# Patient Record
Sex: FEMALE | Race: WHITE | NOT HISPANIC OR LATINO | Employment: FULL TIME | URBAN - METROPOLITAN AREA
[De-identification: names, ages, dates, MRNs, and addresses within clinical notes are randomized per-mention and may not be internally consistent; named-entity substitution may affect disease eponyms.]

---

## 2017-09-25 ENCOUNTER — RADIANT APPOINTMENT (OUTPATIENT)
Dept: MAMMOGRAPHY | Facility: CLINIC | Age: 52
End: 2017-09-25
Payer: COMMERCIAL

## 2017-09-25 ENCOUNTER — OFFICE VISIT (OUTPATIENT)
Dept: OBGYN | Facility: CLINIC | Age: 52
End: 2017-09-25
Payer: COMMERCIAL

## 2017-09-25 VITALS
DIASTOLIC BLOOD PRESSURE: 58 MMHG | SYSTOLIC BLOOD PRESSURE: 98 MMHG | HEIGHT: 68 IN | BODY MASS INDEX: 22.28 KG/M2 | WEIGHT: 147 LBS

## 2017-09-25 DIAGNOSIS — Z12.4 CERVICAL CANCER SCREENING: ICD-10-CM

## 2017-09-25 DIAGNOSIS — Z13.6 ENCOUNTER FOR LIPID SCREENING FOR CARDIOVASCULAR DISEASE: ICD-10-CM

## 2017-09-25 DIAGNOSIS — Z13.0 SCREENING FOR DISORDER OF BLOOD AND BLOOD-FORMING ORGANS: ICD-10-CM

## 2017-09-25 DIAGNOSIS — Z01.419 ENCOUNTER FOR GYNECOLOGICAL EXAMINATION WITHOUT ABNORMAL FINDING: Primary | ICD-10-CM

## 2017-09-25 DIAGNOSIS — Z13.29 SCREENING FOR THYROID DISORDER: ICD-10-CM

## 2017-09-25 DIAGNOSIS — Z13.228 SCREENING FOR METABOLIC DISORDER: ICD-10-CM

## 2017-09-25 DIAGNOSIS — Z12.31 VISIT FOR SCREENING MAMMOGRAM: ICD-10-CM

## 2017-09-25 DIAGNOSIS — Z23 NEED FOR PROPHYLACTIC VACCINATION AND INOCULATION AGAINST INFLUENZA: ICD-10-CM

## 2017-09-25 DIAGNOSIS — Z13.220 ENCOUNTER FOR LIPID SCREENING FOR CARDIOVASCULAR DISEASE: ICD-10-CM

## 2017-09-25 LAB
ERYTHROCYTE [DISTWIDTH] IN BLOOD BY AUTOMATED COUNT: 12 % (ref 10–15)
HCT VFR BLD AUTO: 40.7 % (ref 35–47)
HGB BLD-MCNC: 13.8 G/DL (ref 11.7–15.7)
MCH RBC QN AUTO: 32.2 PG (ref 26.5–33)
MCHC RBC AUTO-ENTMCNC: 33.9 G/DL (ref 31.5–36.5)
MCV RBC AUTO: 95 FL (ref 78–100)
PLATELET # BLD AUTO: 205 10E9/L (ref 150–450)
RBC # BLD AUTO: 4.28 10E12/L (ref 3.8–5.2)
WBC # BLD AUTO: 6.3 10E9/L (ref 4–11)

## 2017-09-25 PROCEDURE — 90471 IMMUNIZATION ADMIN: CPT | Performed by: OBSTETRICS & GYNECOLOGY

## 2017-09-25 PROCEDURE — G0202 SCR MAMMO BI INCL CAD: HCPCS | Mod: TC

## 2017-09-25 PROCEDURE — 36415 COLL VENOUS BLD VENIPUNCTURE: CPT | Performed by: OBSTETRICS & GYNECOLOGY

## 2017-09-25 PROCEDURE — 84443 ASSAY THYROID STIM HORMONE: CPT | Performed by: OBSTETRICS & GYNECOLOGY

## 2017-09-25 PROCEDURE — 77063 BREAST TOMOSYNTHESIS BI: CPT | Mod: TC

## 2017-09-25 PROCEDURE — 99396 PREV VISIT EST AGE 40-64: CPT | Mod: 25 | Performed by: OBSTETRICS & GYNECOLOGY

## 2017-09-25 PROCEDURE — 85027 COMPLETE CBC AUTOMATED: CPT | Performed by: OBSTETRICS & GYNECOLOGY

## 2017-09-25 PROCEDURE — G0145 SCR C/V CYTO,THINLAYER,RESCR: HCPCS | Performed by: OBSTETRICS & GYNECOLOGY

## 2017-09-25 PROCEDURE — 80061 LIPID PANEL: CPT | Performed by: OBSTETRICS & GYNECOLOGY

## 2017-09-25 PROCEDURE — 90686 IIV4 VACC NO PRSV 0.5 ML IM: CPT | Performed by: OBSTETRICS & GYNECOLOGY

## 2017-09-25 PROCEDURE — 80053 COMPREHEN METABOLIC PANEL: CPT | Performed by: OBSTETRICS & GYNECOLOGY

## 2017-09-25 PROCEDURE — 87624 HPV HI-RISK TYP POOLED RSLT: CPT | Performed by: OBSTETRICS & GYNECOLOGY

## 2017-09-25 ASSESSMENT — ANXIETY QUESTIONNAIRES
5. BEING SO RESTLESS THAT IT IS HARD TO SIT STILL: NOT AT ALL
3. WORRYING TOO MUCH ABOUT DIFFERENT THINGS: NOT AT ALL
6. BECOMING EASILY ANNOYED OR IRRITABLE: NOT AT ALL
1. FEELING NERVOUS, ANXIOUS, OR ON EDGE: NOT AT ALL
IF YOU CHECKED OFF ANY PROBLEMS ON THIS QUESTIONNAIRE, HOW DIFFICULT HAVE THESE PROBLEMS MADE IT FOR YOU TO DO YOUR WORK, TAKE CARE OF THINGS AT HOME, OR GET ALONG WITH OTHER PEOPLE: NOT DIFFICULT AT ALL
7. FEELING AFRAID AS IF SOMETHING AWFUL MIGHT HAPPEN: NOT AT ALL
GAD7 TOTAL SCORE: 0
2. NOT BEING ABLE TO STOP OR CONTROL WORRYING: NOT AT ALL

## 2017-09-25 ASSESSMENT — PATIENT HEALTH QUESTIONNAIRE - PHQ9
5. POOR APPETITE OR OVEREATING: NOT AT ALL
SUM OF ALL RESPONSES TO PHQ QUESTIONS 1-9: 0

## 2017-09-25 NOTE — PROGRESS NOTES
Susan is a 51 year old  female who presents for annual exam.     Besides routine health maintenance, she has no other health concerns today . Is fasting today, would like flu shot.    HPI:  The patient does not have PCP.      Periods regular yet. Vasectomy for contraception    Is fasting for labs.   Mammo today.     Exercising 3-4x/wk. Takes vit D, fish oil, MTV.       GYNECOLOGIC HISTORY:    Patient's last menstrual period was 2017.  Her current contraception method is: vasectomy.  She  reports that she has never smoked. She does not have any smokeless tobacco history on file.      Patient is sexually active.  STD testing offered?  Declined  Last PHQ-9 score on record =   PHQ-9 SCORE 2017   Total Score 0     Last GAD7 score on record =   BETH-7 SCORE 2017   Total Score 0     Alcohol Score = 3    HEALTH MAINTENANCE:  Cholesterol: (  Cholesterol   Date Value Ref Range Status   2014 157 125 - 200 mg/dL Final      Last Mammo: one year ago, Result: normal, Next Mammo: today   Pap: (  Lab Results   Component Value Date    PAP Negative 2012    PAP Negative 2011    PAP Negative 2010    ) HPV-  Colonoscopy:  2011, Result: normal, Next Colonoscopy: 4 years.  Dexa:  NA    Health maintenance updated:  yes    HISTORY:  Obstetric History       T4      L4     SAB0   TAB0   Ectopic0   Multiple0   Live Births4       # Outcome Date GA Lbr Remigio/2nd Weight Sex Delivery Anes PTL Lv   5 Term 02 39w0d  8 lb 8 oz (3.856 kg) M    NYA      Name: HOMA   4 Term 00 38w0d  7 lb 14 oz (3.572 kg) F    NYA      Name: BRAYDON   3 Term 01/15/99 38w0d  6 lb 15 oz (3.147 kg) M    NYA      Name: ANN   2 Term 97 39w0d  9 lb 2 oz (4.139 kg) M    NYA      Name: JOSÉ MIGUEL   1 SAB                   There is no problem list on file for this patient.    Past Surgical History:   Procedure Laterality Date     ENDOMETRIAL SAMPLING (BIOPSY)  1/26/10    FOR ENDO  "CELLS ON PAP--SECRETORY ENDO      Social History   Substance Use Topics     Smoking status: Never Smoker     Smokeless tobacco: Not on file     Alcohol use 0.0 oz/week     0 Standard drinks or equivalent per week      Problem (# of Occurrences) Relation (Name,Age of Onset)    Breast Cancer (1) Mother    Colon Cancer (1) Maternal Grandfather    Hyperlipidemia (1) Father    Hypertension (1) Unspecified            Current Outpatient Prescriptions   Medication Sig     multivitamin, therapeutic with minerals (MULTI-VITAMIN) TABS Take 1 tablet by mouth daily     VITAMIN D, CHOLECALCIFEROL, PO Take by mouth daily     Omega-3 Fatty Acids (OMEGA-3 FISH OIL PO)      No current facility-administered medications for this visit.      No Known Allergies    Past medical, surgical, social and family histories were reviewed and updated in EPIC.    ROS:   12 point review of systems negative other than symptoms noted below.  Constitutional: Fatigue  Genitourinary: Cramps  Musculoskeletal: Joint Pain    EXAM:  BP 98/58  Ht 5' 8\" (1.727 m)  Wt 147 lb (66.7 kg)  LMP 09/06/2017  Breastfeeding? No  BMI 22.35 kg/m2   BMI: Body mass index is 22.35 kg/(m^2).    PHYSICAL EXAM:  Constitutional:  Appearance: Well nourished, well developed, alert, in no acute distress  Neck:  Lymph Nodes:  No lymphadenopathy present    Thyroid:  Gland size normal, nontender, no nodules or masses present  on palpation  Chest:  Respiratory Effort:  Breathing unlabored  Cardiovascular:    Heart: Auscultation:  Regular rate, normal rhythm, no murmurs present  Breasts: Inspection of Breasts:  No lymphadenopathy present., Palpation of Breasts and Axillae:  No masses present on palpation, no breast tenderness., Axillary Lymph Nodes:  No lymphadenopathy present. and No nodularity, asymmetry or nipple discharge bilaterally.  Gastrointestinal:   Abdominal Examination:  Abdomen nontender to palpation, tone normal without rigidity or guarding, no masses present, " umbilicus without lesions   Liver and Spleen:  No hepatomegaly present, liver nontender to palpation    Hernias:  No hernias present  Lymphatic: Lymph Nodes:  No other lymphadenopathy present  Skin:  General Inspection:  No rashes present, no lesions present, no areas of  discoloration    Genitalia and Groin:  No rashes present, no lesions present, no areas of  discoloration, no masses present  Neurologic/Psychiatric:    Mental Status:  Oriented X3     Pelvic Exam:  External Genitalia:     Normal appearance for age, no discharge present, no tenderness present, no inflammatory lesions present, color normal  Vagina:     Normal vaginal vault without central or paravaginal defects, no discharge present, no inflammatory lesions present, no masses present  Bladder:     Nontender to palpation  Urethra:   Urethral Body:  Urethra palpation normal, urethra structural support normal   Urethral Meatus:  No erythema or lesions present  Cervix:     Appearance healthy, no lesions present, nontender to palpation, no bleeding present  Uterus:     Uterus: firm, normal sized and nontender, midplane in position.   Adnexa:     No adnexal tenderness present, no adnexal masses present  Perineum:     Perineum within normal limits, no evidence of trauma, no rashes or skin lesions present  Anus:     Anus within normal limits, no hemorrhoids present  Inguinal Lymph Nodes:     No lymphadenopathy present  Pubic Hair:     Normal pubic hair distribution for age  Genitalia and Groin:     No rashes present, no lesions present, no areas of discoloration, no masses present      COUNSELING:   Reviewed preventive health counseling, as reflected in patient instructions       Regular exercise       Healthy diet/nutrition       Osteoporosis Prevention/Bone Health      BMI: Body mass index is 22.35 kg/(m^2).        ASSESSMENT:  51 year old female with satisfactory annual exam.    ICD-10-CM    1. Encounter for gynecological examination without abnormal  finding Z01.419 Pap imaged thin layer screen with HPV - recommended age 30 - 65     HPV High Risk Types DNA Cervical   2. Encounter for lipid screening for cardiovascular disease Z13.220 Lipid panel    Z13.6    3. Screening for metabolic disorder Z13.228 Comprehensive metabolic panel   4. Screening for disorder of blood and blood-forming organs Z13.0 CBC with platelets   5. Screening for thyroid disorder Z13.29 TSH   6. Cervical cancer screening Z12.4 Pap imaged thin layer screen with HPV - recommended age 30 - 65     HPV High Risk Types DNA Cervical       PLAN:  -Pap/hpv obtained for cervical cancer screening. Reviewed guidelines-pap q 3yrs until age 30 when co-testing q 5 years.  -Breast self awareness discussed. Due for mammogram.  -Discussed exercise-making plan, strength training. Nutrition encouraged.  -Colonoscopy UTD  -Osteoporosis prevention discussed.  -Fasting labs, orders reviewed  -Regular periods yet. Vasectomy for contraception.   -Return one year for next annual exam      Annette Gallo Masters, DO

## 2017-09-25 NOTE — MR AVS SNAPSHOT
"              After Visit Summary   9/25/2017    Susan Reyes    MRN: [id number]           Patient Information     Date Of Birth          1965        Visit Information        Provider Department      9/25/2017 9:00 AM Annette Roman DO [facility]        Today's Diagnoses     Encounter for gynecological examination without abnormal finding    -  1    Encounter for lipid screening for cardiovascular disease        Screening for metabolic disorder        Screening for disorder of blood and blood-forming organs        Screening for thyroid disorder        Cervical cancer screening        Need for prophylactic vaccination and inoculation against influenza           Follow-ups after your visit        Follow-up notes from your care team     Return in about 1 year (around 9/25/2018).      Who to contact     If you have questions or need follow up information about today's clinic visit or your schedule please contact [facility] directly at [phone].  Normal or non-critical lab and imaging results will be communicated to you by [employer], letter or phone within 4 business days after the clinic has received the results. If you do not hear from us within 7 days, please contact the clinic through [employer] or phone. If you have a critical or abnormal lab result, we will notify you by phone as soon as possible.  Submit refill requests through [employer] or call your pharmacy and they will forward the refill request to us. Please allow 3 business days for your refill to be completed.          Additional Information About Your Visit        MyChart Information     [employer] lets you send messages to your doctor, view your test results, renew your prescriptions, schedule appointments and more. To sign up, go to [email or web address] . Click on \"Log in\" on the left side of the screen, which will take you to the Welcome page. Then click on \"Sign up Now\" on the right side of the " "page.     You will be asked to enter the access code listed below, as well as some personal information. Please follow the directions to create your username and password.     Your access code is: [email or web address]  Expires: 2017 10:16 AM     Your access code will  in 90 days. If you need help or a new code, please call your [place] clinic or [phone].        Care EveryWhere ID     This is your Care EveryWhere ID. This could be used by other organizations to access your [place] medical records  [id number]        Your Vitals Were     Height Last Period Breastfeeding? BMI (Body Mass Index)          5' 8\" (1.727 m) 2017 No 22.35 kg/m2         Blood Pressure from Last 3 Encounters:   17 98/58   16 100/52   08/10/15 112/68    Weight from Last 3 Encounters:   17 147 lb (66.7 kg)   16 146 lb (66.2 kg)   08/10/15 147 lb (66.7 kg)              We Performed the Following     CBC with platelets     Comprehensive metabolic panel     FLU VAC, SPLIT VIRUS IM > 3 YO (QUADRIVALENT) [[id number]]     HPV High Risk Types DNA Cervical     Lipid panel     Pap imaged thin layer screen with HPV - recommended age 30 - 65     TSH     Vaccine Administration, Initial [26595]        Primary Care Provider    None Specified       No primary provider on file.        Equal Access to Services     MARS QUICK : Hadii carline Lopez, waaxda yara, qaybta dakotaalcosta plummer . So [facility] [phone].    ATENCIÓN: Si habla español, tiene a pickett disposición servicios gratuitos de asistencia lingüística. Sotero al [phone].    We comply with applicable federal civil rights laws and [place] laws. We do not discriminate on the basis of race, color, national origin, age, disability sex, sexual orientation or gender identity.            Thank you!     Thank you for choosing [facility]  for your care. Our goal is always to provide you with " excellent care. Hearing back from our patients is one way we can continue to improve our services. Please take a few minutes to complete the written survey that you may receive in the mail after your visit with us. Thank you!             Your Updated Medication List - Protect others around you: Learn how to safely use, store and throw away your medicines at www.disposemymeds.org.          This list is accurate as of: 9/25/17 10:16 AM.  Always use your most recent med list.                   Brand Name Dispense Instructions for use Diagnosis    Multi-vitamin Tabs tablet      Take 1 tablet by mouth daily        OMEGA-3 FISH OIL PO           VITAMIN D (CHOLECALCIFEROL) PO      Take by mouth daily

## 2017-09-25 NOTE — LETTER
October 6, 2017    Susan Reyes  [address] [place] [id number]    Dear Susan,  We are happy to inform you that your PAP smear result from 9/25/17 is normal.  We are now able to do a follow up test on PAP smears. The DNA test is for HPV (Human Papilloma Virus). Cervical cancer is closely linked with certain types of HPV. Your result showed no evidence of high risk HPV.  Therefore we recommend you return in 5 years for your next pap smear and HPV test.  You will still need to return to the clinic every year for an annual exam and other preventive tests.  Please contact the clinic at [phone] with any questions.  Sincerely,    Annette Freemans, DO/rlm

## 2017-09-25 NOTE — PROGRESS NOTES
Injectable Influenza Immunization Documentation    1.  Is the person to be vaccinated sick today?   No    2. Does the person to be vaccinated have an allergy to a component   of the vaccine?   No    3. Has the person to be vaccinated ever had a serious reaction   to influenza vaccine in the past?   No    4. Has the person to be vaccinated ever had Guillain-Barré syndrome?   No    Form completed by Isabell Tidwell MA

## 2017-09-26 LAB
ALBUMIN SERPL-MCNC: 3.6 G/DL (ref 3.4–5)
ALP SERPL-CCNC: 52 U/L (ref 40–150)
ALT SERPL W P-5'-P-CCNC: 23 U/L (ref 0–50)
ANION GAP SERPL CALCULATED.3IONS-SCNC: 5 MMOL/L (ref 3–14)
AST SERPL W P-5'-P-CCNC: 17 U/L (ref 0–45)
BILIRUB SERPL-MCNC: 0.8 MG/DL (ref 0.2–1.3)
BUN SERPL-MCNC: 13 MG/DL (ref 7–30)
CALCIUM SERPL-MCNC: 8.5 MG/DL (ref 8.5–10.1)
CHLORIDE SERPL-SCNC: 104 MMOL/L (ref 94–109)
CHOLEST SERPL-MCNC: 155 MG/DL
CO2 SERPL-SCNC: 29 MMOL/L (ref 20–32)
CREAT SERPL-MCNC: 0.68 MG/DL (ref 0.52–1.04)
GFR SERPL CREATININE-BSD FRML MDRD: >90 ML/MIN/1.7M2
GLUCOSE SERPL-MCNC: 72 MG/DL (ref 70–99)
HDLC SERPL-MCNC: 76 MG/DL
LDLC SERPL CALC-MCNC: 71 MG/DL
NONHDLC SERPL-MCNC: 79 MG/DL
POTASSIUM SERPL-SCNC: 3.7 MMOL/L (ref 3.4–5.3)
PROT SERPL-MCNC: 7.9 G/DL (ref 6.8–8.8)
SODIUM SERPL-SCNC: 138 MMOL/L (ref 133–144)
TRIGL SERPL-MCNC: 42 MG/DL
TSH SERPL DL<=0.005 MIU/L-ACNC: 0.86 MU/L (ref 0.4–4)

## 2017-09-26 ASSESSMENT — ANXIETY QUESTIONNAIRES: GAD7 TOTAL SCORE: 0

## 2017-09-27 LAB
COPATH REPORT: NORMAL
PAP: NORMAL

## 2017-09-29 LAB
FINAL DIAGNOSIS: NORMAL
HPV HR 12 DNA CVX QL NAA+PROBE: NEGATIVE
HPV16 DNA SPEC QL NAA+PROBE: NEGATIVE
HPV18 DNA SPEC QL NAA+PROBE: NEGATIVE
SPECIMEN DESCRIPTION: NORMAL

## 2017-10-20 ENCOUNTER — TELEPHONE (OUTPATIENT)
Dept: OBGYN | Facility: CLINIC | Age: 52
End: 2017-10-20

## 2017-10-20 NOTE — TELEPHONE ENCOUNTER
Annual 9/25/17. Pt was fine yesterday. Woke up during night. Room was spinning. N&V, no diarrhea. Pt has never had vertigo or dizziness. Pt only takes vitamin. No injury to head or neck. Denies fever. Pt worked out last evening, normal routine, and then had 10 min sauna. Could be dehydration? Pt stated she was drinking water.  No dizziness now. Worried about going into the weekend with the vertigo. Routing to Dr. Roman. Please advise.

## 2017-10-20 NOTE — TELEPHONE ENCOUNTER
Called pt she denied speech changes, vision changes, facial movement changes.  Informed of Dr. Roman' recommendations. Pt stated understanding and had no further questions.

## 2017-10-20 NOTE — TELEPHONE ENCOUNTER
Often vertigo needs to run its course. She can try an OTC med like antivert. Resting otherwise for time being.  Please ensure no other symptoms like speech changes, vision changes, facial movement changes.     Annette Gallo Masters, DO

## 2018-10-19 ENCOUNTER — RADIANT APPOINTMENT (OUTPATIENT)
Dept: MAMMOGRAPHY | Facility: CLINIC | Age: 53
End: 2018-10-19
Payer: COMMERCIAL

## 2018-10-19 ENCOUNTER — OFFICE VISIT (OUTPATIENT)
Dept: OBGYN | Facility: CLINIC | Age: 53
End: 2018-10-19
Payer: COMMERCIAL

## 2018-10-19 VITALS
SYSTOLIC BLOOD PRESSURE: 102 MMHG | BODY MASS INDEX: 22.58 KG/M2 | WEIGHT: 149 LBS | HEIGHT: 68 IN | DIASTOLIC BLOOD PRESSURE: 60 MMHG | HEART RATE: 68 BPM

## 2018-10-19 DIAGNOSIS — Z12.31 VISIT FOR SCREENING MAMMOGRAM: ICD-10-CM

## 2018-10-19 DIAGNOSIS — Z23 NEED FOR PROPHYLACTIC VACCINATION AND INOCULATION AGAINST INFLUENZA: ICD-10-CM

## 2018-10-19 DIAGNOSIS — Z01.419 ENCOUNTER FOR GYNECOLOGICAL EXAMINATION WITHOUT ABNORMAL FINDING: Primary | ICD-10-CM

## 2018-10-19 PROCEDURE — 90686 IIV4 VACC NO PRSV 0.5 ML IM: CPT | Performed by: OBSTETRICS & GYNECOLOGY

## 2018-10-19 PROCEDURE — 77063 BREAST TOMOSYNTHESIS BI: CPT | Mod: TC

## 2018-10-19 PROCEDURE — 77067 SCR MAMMO BI INCL CAD: CPT | Mod: TC

## 2018-10-19 PROCEDURE — 90471 IMMUNIZATION ADMIN: CPT | Performed by: OBSTETRICS & GYNECOLOGY

## 2018-10-19 PROCEDURE — 99396 PREV VISIT EST AGE 40-64: CPT | Performed by: OBSTETRICS & GYNECOLOGY

## 2018-10-19 ASSESSMENT — ANXIETY QUESTIONNAIRES
5. BEING SO RESTLESS THAT IT IS HARD TO SIT STILL: NOT AT ALL
3. WORRYING TOO MUCH ABOUT DIFFERENT THINGS: NOT AT ALL
IF YOU CHECKED OFF ANY PROBLEMS ON THIS QUESTIONNAIRE, HOW DIFFICULT HAVE THESE PROBLEMS MADE IT FOR YOU TO DO YOUR WORK, TAKE CARE OF THINGS AT HOME, OR GET ALONG WITH OTHER PEOPLE: NOT DIFFICULT AT ALL
GAD7 TOTAL SCORE: 0
2. NOT BEING ABLE TO STOP OR CONTROL WORRYING: NOT AT ALL
6. BECOMING EASILY ANNOYED OR IRRITABLE: NOT AT ALL
7. FEELING AFRAID AS IF SOMETHING AWFUL MIGHT HAPPEN: NOT AT ALL
1. FEELING NERVOUS, ANXIOUS, OR ON EDGE: NOT AT ALL

## 2018-10-19 ASSESSMENT — PATIENT HEALTH QUESTIONNAIRE - PHQ9: 5. POOR APPETITE OR OVEREATING: NOT AT ALL

## 2018-10-19 NOTE — PROGRESS NOTES
Susan is a 53 year old  female who presents for annual exam.     Besides routine health maintenance, she has no other health concerns today .    HPI:  The patient's PCP is Werner Lafleur. Krystyna Roque.     Having irregular periods, but not skipping. NOt bothered by it yet. No hot flashes.     Exercising regularly, varied. MTV with D and fish oil.     Has seen PCP for labs.       GYNECOLOGIC HISTORY:    Patient's last menstrual period was 10/13/2018 (exact date).  Her current contraception method is: vasectomy.  She  reports that she has never smoked. She has never used smokeless tobacco.    Patient is sexually active.  STD testing offered?  Declined  Last PHQ-9 score on record =   PHQ-9 SCORE 10/19/2018   Total Score 0     Last GAD7 score on record =   BETH-7 SCORE 10/19/2018   Total Score 0     Alcohol Score = 3    HEALTH MAINTENANCE:  Cholesterol:  17   Total= 155, Triglycerides=42, HDL=76, LDL=71, FBS=72, TSH=0.86  Last Mammo: one year ago, Result: normal, Next Mammo: today   Pap:   Lab Results   Component Value Date    PAP NIL, HPV- 2017    PAP Negative 2012    PAP Negative 2011      Colonoscopy:  11, Result: normal, Next Colonoscopy: 3 years.  Dexa:  Never    Health maintenance updated:  yes    HISTORY:  Obstetric History       T4      L4     SAB1   TAB0   Ectopic0   Multiple0   Live Births4       # Outcome Date GA Lbr Remigio/2nd Weight Sex Delivery Anes PTL Lv   5 Term 02 39w0d  8 lb 8 oz (3.856 kg) M    NYA      Name: HOMA   4 Term 00 38w0d  7 lb 14 oz (3.572 kg) F    NYA      Name: BRAYDON   3 Term 01/15/99 38w0d  6 lb 15 oz (3.147 kg) M    NYA      Name: ANN   2 Term 97 39w0d  9 lb 2 oz (4.139 kg) M    NYA      Name: JOSÉ MIGUEL   1 SAB                   There is no problem list on file for this patient.    Past Surgical History:   Procedure Laterality Date     ENDOMETRIAL SAMPLING (BIOPSY)  1/26/10    FOR ENDO CELLS ON  "PAP--SECRETORY ENDO      Social History   Substance Use Topics     Smoking status: Never Smoker     Smokeless tobacco: Never Used     Alcohol use 0.0 oz/week     0 Standard drinks or equivalent per week      Problem (# of Occurrences) Relation (Name,Age of Onset)    Breast Cancer (1) Mother    Colon Cancer (1) Maternal Grandfather    Hyperlipidemia (1) Father    Hypertension (1) Other            Current Outpatient Prescriptions   Medication Sig     multivitamin, therapeutic with minerals (MULTI-VITAMIN) TABS Take 1 tablet by mouth daily     Omega-3 Fatty Acids (OMEGA-3 FISH OIL PO)      VITAMIN D, CHOLECALCIFEROL, PO Take by mouth daily     No current facility-administered medications for this visit.      Allergies   Allergen Reactions     Amoxicillin Rash       Past medical, surgical, social and family histories were reviewed and updated in EPIC.    ROS:   12 point review of systems negative other than symptoms noted below.  Gastrointestinal: Blood in Stools  Genitourinary: Irregular Menses    EXAM:  /60  Pulse 68  Ht 5' 8.25\" (1.734 m)  Wt 149 lb (67.6 kg)  LMP 10/13/2018 (Exact Date)  BMI 22.49 kg/m2   BMI: Body mass index is 22.49 kg/(m^2).    PHYSICAL EXAM:  Constitutional:  Appearance: Well nourished, well developed, alert, in no acute distress  Neck:  Lymph Nodes:  No lymphadenopathy present    Thyroid:  Gland size normal, nontender, no nodules or masses present  on palpation  Chest:  Respiratory Effort:  Breathing unlabored  Cardiovascular:    Heart: Auscultation:  Regular rate, normal rhythm, no murmurs present  Breasts: Inspection of Breasts:  No lymphadenopathy present., Palpation of Breasts and Axillae:  No masses present on palpation, no breast tenderness., Axillary Lymph Nodes:  No lymphadenopathy present. and No nodularity, asymmetry or nipple discharge bilaterally.  Gastrointestinal:   Abdominal Examination:  Abdomen nontender to palpation, tone normal without rigidity or guarding, no " masses present, umbilicus without lesions   Liver and Spleen:  No hepatomegaly present, liver nontender to palpation    Hernias:  No hernias present  Lymphatic: Lymph Nodes:  No other lymphadenopathy present  Skin:  General Inspection:  No rashes present, no lesions present, no areas of  discoloration    Genitalia and Groin:  No rashes present, no lesions present, no areas of  discoloration, no masses present  Neurologic/Psychiatric:    Mental Status:  Oriented X3     Pelvic Exam:  External Genitalia:     Normal appearance for age, no discharge present, no tenderness present, no inflammatory lesions present, color normal  Vagina:     Normal vaginal vault without central or paravaginal defects, no discharge present, no inflammatory lesions present, no masses present  Bladder:     Nontender to palpation  Urethra:   Urethral Body:  Urethra palpation normal, urethra structural support normal   Urethral Meatus:  No erythema or lesions present  Cervix:     Appearance healthy, no lesions present, nontender to palpation, no bleeding present  Uterus:     Uterus: firm, normal sized and nontender, anteverted in position.   Adnexa:     No adnexal tenderness present, no adnexal masses present  Perineum:     Perineum within normal limits, no evidence of trauma, no rashes or skin lesions present  Anus:     Anus within normal limits, no hemorrhoids present  Inguinal Lymph Nodes:     No lymphadenopathy present  Pubic Hair:     Normal pubic hair distribution for age  Genitalia and Groin:     No rashes present, no lesions present, no areas of discoloration, no masses present      COUNSELING:   Reviewed preventive health counseling, as reflected in patient instructions    BMI: Body mass index is 22.49 kg/(m^2).      ASSESSMENT:  53 year old female with satisfactory annual exam.    ICD-10-CM    1. Encounter for gynecological examination without abnormal finding Z01.419        PLAN:  -UTD (2017) for cervical cancer screening. Reviewed  guidelines-pap q 3yrs until age 30 when co-testing q 5 years.  -Breast self awareness discussed. Due for mammogram.  -Discussed exercise-making plan, strength training. Nutrition encouraged.  -Osteoporosis prevention discussed.  -PCP manages labs  -Return one year for next annual exam      Annette Gallo Masters, DO

## 2018-10-19 NOTE — MR AVS SNAPSHOT
"              After Visit Summary   10/19/2018    Susan Reyes    MRN: [id number]           Patient Information     Date Of Birth          1965        Visit Information        Provider Department      10/19/2018 9:00 AM Annette Roman,  [facility]        Today's Diagnoses     Encounter for gynecological examination without abnormal finding    -  1       Follow-ups after your visit        Follow-up notes from your care team     Return in about 1 year (around 10/19/2019).      Who to contact     If you have questions or need follow up information about today's clinic visit or your schedule please contact [facility] at [phone].  Normal or non-critical lab and imaging results will be communicated to you by MyChart, letter or phone within 4 business days after the clinic has received the results. If you do not hear from us within 7 days, please contact the clinic through [employer] or phone. If you have a critical or abnormal lab result, we will notify you by phone as soon as possible.  Submit refill requests through [employer] or call your pharmacy and they will forward the refill request to us. Please allow 3 business days for your refill to be completed.          Additional Information About Your Visit        MyChart Information     [employer] lets you send messages to your doctor, view your test results, renew your prescriptions, schedule appointments and more. To sign up, go to [email or web address] . Click on \"Log in\" on the left side of the screen, which will take you to the Welcome page. Then click on \"Sign up Now\" on the right side of the page.     You will be asked to enter the access code listed below, as well as some personal information. Please follow the directions to create your username and password.     Your access code is: [email or web address]  Expires: 2019  8:33 AM     Your access code will  in 90 days. If you need help or a new code, " "please call your [place] clinic or [phone].        Care EveryWhere ID     This is your Care EveryWhere ID. This could be used by other organizations to access your [place] medical records  [id number]        Your Vitals Were     Pulse Height Last Period BMI (Body Mass Index)          68 5' 8.25\" (1.734 m) 10/13/2018 (Exact Date) 22.49 kg/m2         Blood Pressure from Last 3 Encounters:   10/19/18 102/60   09/25/17 98/58   08/25/16 100/52    Weight from Last 3 Encounters:   10/19/18 149 lb (67.6 kg)   09/25/17 147 lb (66.7 kg)   08/25/16 146 lb (66.2 kg)              Today, you had the following     No orders found for display       Primary Care Provider Office Phone # Fax #    Krystyna Roque [phone]       [facility] [address]  [facility] [address]        Equal Access to Services     [facility] CenterNGUYỄN : Hadii aad ku hadasho Soomaali, waaxda luqadaha, qaybta kaalmada adeegyada, waxay idiin hayaan kimmy lara . So [facility] [phone].    ATENCIÓN: Si habla español, tiene a pickett disposición servicios gratuitos de asistencia lingüística. Llame al [phone].    We comply with applicable federal civil rights laws and [place] laws. We do not discriminate on the basis of race, color, national origin, age, disability, sex, sexual orientation, or gender identity.            Thank you!     Thank you for choosing [facility]  for your care. Our goal is always to provide you with excellent care. Hearing back from our patients is one way we can continue to improve our services. Please take a few minutes to complete the written survey that you may receive in the mail after your visit with us. Thank you!             Your Updated Medication List - Protect others around you: Learn how to safely use, store and throw away your medicines at www.disposemymeds.org.          This list is accurate as of 10/19/18  9:22 AM.  Always use your most recent med list.                "    Brand Name Dispense Instructions for use Diagnosis    Multi-vitamin Tabs tablet      Take 1 tablet by mouth daily        OMEGA-3 FISH OIL PO           VITAMIN D (CHOLECALCIFEROL) PO      Take by mouth daily

## 2018-10-19 NOTE — PROGRESS NOTES

## 2018-10-20 ASSESSMENT — PATIENT HEALTH QUESTIONNAIRE - PHQ9: SUM OF ALL RESPONSES TO PHQ QUESTIONS 1-9: 0

## 2018-10-20 ASSESSMENT — ANXIETY QUESTIONNAIRES: GAD7 TOTAL SCORE: 0

## 2019-10-01 ENCOUNTER — HEALTH MAINTENANCE LETTER (OUTPATIENT)
Age: 54
End: 2019-10-01

## 2019-11-06 NOTE — PROGRESS NOTES
Susan is a 54 year old  female who presents for annual exam.     Besides routine health maintenance, she has no other health concerns today .    HPI:  The patient's PCP is Krystyna Roque.      Does barre classes 3x/wk, walks dogs.  MTV with fishoil and vit d    Period was gone April thru august. Now are regular monthly q 24-28d for 3 days.     Going to [facility] in February, to see her son who will be sent spending the spring semester there studying abroad studies business    Fasting for labs        GYNECOLOGIC HISTORY:    Patient's last menstrual period was 10/16/2019.    Regular menses? no  Menses every irregular days.  Length of menses: 3 days    Her current contraception method is: vasectomy.  She  reports that she has never smoked. She has never used smokeless tobacco.    Patient is sexually active.  STD testing offered?  Declined  Last PHQ-9 score on record =   PHQ-9 SCORE 2019   PHQ-9 Total Score 0     Last GAD7 score on record =   BETH-7 SCORE 2019   Total Score 0     Alcohol Score = 4    HEALTH MAINTENANCE:  Cholesterol:   Recent Labs   Lab Test 17  0948 14   CHOL 155 157   HDL 76 74   LDL 71 72   TRIG 42 56   GLUCOSE 72      TSH   Date Value Ref Range Status   2017 0.86 0.40 - 4.00 mU/L Final      Last Mammo: 10/19/18, Result: Normal, Next Mammo: Today   Pap: HPV: negative  Lab Results   Component Value Date    PAP NIL 2017    PAP Negative 2012    PAP Negative 2011      Colonoscopy:  11, Result: Normal, Next Colonoscopy: .  Dexa:  never    Health maintenance updated:  yes    HISTORY:  OB History    Para Term  AB Living   5 4 4 0 1 4   SAB TAB Ectopic Multiple Live Births   1 0 0 0 4      # Outcome Date GA Lbr Remigio/2nd Weight Sex Delivery Anes PTL Lv   5 Term 02 39w0d  3.856 kg (8 lb 8 oz) M    NYA      Name: HOMA   4 Term 00 38w0d  3.572 kg (7 lb 14 oz) F    NYA      Name: BRAYDON   3 Term 01/15/99 38w0d  3.147 kg (6  "lb 15 oz)     NYA      Name: ANN Colón Term 97 39w0d  4.139 kg (9 lb 2 oz) M    NYA      Name: JOSÉ MIGUEL Schultz SAB                There is no problem list on file for this patient.    Past Surgical History:   Procedure Laterality Date     ENDOMETRIAL SAMPLING (BIOPSY)  1/26/10    FOR ENDO CELLS ON PAP--SECRETORY ENDO      Social History     Tobacco Use     Smoking status: Never Smoker     Smokeless tobacco: Never Used   Substance Use Topics     Alcohol use: Yes     Alcohol/week: 0.0 standard drinks      Problem (# of Occurrences) Relation (Name,Age of Onset)    Breast Cancer (1) Mother    Colon Cancer (1) Maternal Grandfather    Hyperlipidemia (1) Father    Hypertension (1) Other            Current Outpatient Medications   Medication Sig     multivitamin, therapeutic with minerals (MULTI-VITAMIN) TABS Take 1 tablet by mouth daily     Omega-3 Fatty Acids (OMEGA-3 FISH OIL PO)      VITAMIN D, CHOLECALCIFEROL, PO Take by mouth daily     No current facility-administered medications for this visit.      Allergies   Allergen Reactions     Amoxicillin Rash       Past medical, surgical, social and family histories were reviewed and updated in EPIC.    ROS:   12 point review of systems negative other than symptoms noted below.    EXAM:  /70 (BP Location: Right arm, Patient Position: Sitting, Cuff Size: Adult Regular)   Pulse 72   Ht 1.734 m (5' 8.25\")   Wt 69.6 kg (153 lb 6.4 oz)   LMP 10/16/2019   Breastfeeding? No   BMI 23.15 kg/m     BMI: Body mass index is 23.15 kg/m .    PHYSICAL EXAM:  Constitutional:   Appearance: Well nourished, well developed, alert, in no acute distress  Neck:  Lymph Nodes:  No lymphadenopathy present    Thyroid:  Gland size normal, nontender, no nodules or masses present  on palpation  Chest:  Respiratory Effort:  Breathing unlabored  Cardiovascular:    Heart: Auscultation:  Regular rate, normal rhythm, no murmurs present  Breasts: Inspection of Breasts:  No lymphadenopathy " present., Palpation of Breasts and Axillae:  No masses present on palpation, no breast tenderness., Axillary Lymph Nodes:  No lymphadenopathy present. and No nodularity, asymmetry or nipple discharge bilaterally.  Gastrointestinal:   Abdominal Examination:  Abdomen nontender to palpation, tone normal without rigidity or guarding, no masses present, umbilicus without lesions   Liver and Spleen:  No hepatomegaly present, liver nontender to palpation    Hernias:  No hernias present  Lymphatic: Lymph Nodes:  No other lymphadenopathy present  Skin:  General Inspection:  No rashes present, no lesions present, no areas of  discoloration  Neurologic:    Mental Status:  Oriented X3.  Normal strength and tone, sensory exam                grossly normal, mentation intact and speech normal.    Psychiatric:   Mentation appears normal and affect normal/bright.         Pelvic Exam:  External Genitalia:     Normal appearance for age, no discharge present, no tenderness present, no inflammatory lesions present, color normal  Vagina:     Normal vaginal vault without central or paravaginal defects, no discharge present, no inflammatory lesions present, no masses present  Bladder:     Nontender to palpation  Urethra:   Urethral Body:  Urethra palpation normal, urethra structural support normal   Urethral Meatus:  No erythema or lesions present  Cervix:     Appearance healthy, no lesions present, nontender to palpation, no bleeding present  Uterus:     Uterus: firm, normal sized and nontender, anteverted in position.   Adnexa:     No adnexal tenderness present, no adnexal masses present  Perineum:     Perineum within normal limits, no evidence of trauma, no rashes or skin lesions present  Anus:     Anus within normal limits, no hemorrhoids present  Inguinal Lymph Nodes:     No lymphadenopathy present  Pubic Hair:     Normal pubic hair distribution for age  Genitalia and Groin:     No rashes present, no lesions present, no areas of  discoloration, no masses present      COUNSELING:   Reviewed preventive health counseling, as reflected in patient instructions       Osteoporosis Prevention/Bone Health    BMI: Body mass index is 23.15 kg/m .      ASSESSMENT:  54 year old female with satisfactory annual exam.    ICD-10-CM    1. Encounter for gynecological examination without abnormal finding Z01.419    2. Encounter for lipid screening for cardiovascular disease Z13.220 Lipid panel reflex to direct LDL Fasting    Z13.6    3. Screening for metabolic disorder Z13.228 Comprehensive metabolic panel   4. Need for prophylactic vaccination and inoculation against influenza Z23 INFLUENZA QUAD, RECOMBINANT, P-FREE (RIV4) (FLUBLOCK) [45171]     Vaccine Administration, Initial [00300]       PLAN:  -UTD for cervical cancer screening. Reviewed guidelines-pap q 3yrs until age 30 when co-testing q 5 years.  -Breast self awareness discussed.  Due for mammogram.  -Colonoscopy UTD, due 2021  -Osteoporosis prevention discussed.  -Desires fasting labs, orders reviewed  -Return one year for next annual exam          Annette Gallo Masters, DO

## 2019-11-07 ENCOUNTER — OFFICE VISIT (OUTPATIENT)
Dept: OBGYN | Facility: CLINIC | Age: 54
End: 2019-11-07
Payer: COMMERCIAL

## 2019-11-07 ENCOUNTER — ANCILLARY PROCEDURE (OUTPATIENT)
Dept: MAMMOGRAPHY | Facility: CLINIC | Age: 54
End: 2019-11-07
Payer: COMMERCIAL

## 2019-11-07 VITALS
SYSTOLIC BLOOD PRESSURE: 100 MMHG | WEIGHT: 153.4 LBS | DIASTOLIC BLOOD PRESSURE: 70 MMHG | HEIGHT: 68 IN | HEART RATE: 72 BPM | BODY MASS INDEX: 23.25 KG/M2

## 2019-11-07 DIAGNOSIS — Z01.419 ENCOUNTER FOR GYNECOLOGICAL EXAMINATION WITHOUT ABNORMAL FINDING: Primary | ICD-10-CM

## 2019-11-07 DIAGNOSIS — Z23 NEED FOR PROPHYLACTIC VACCINATION AND INOCULATION AGAINST INFLUENZA: ICD-10-CM

## 2019-11-07 DIAGNOSIS — Z12.31 VISIT FOR SCREENING MAMMOGRAM: ICD-10-CM

## 2019-11-07 DIAGNOSIS — Z13.228 SCREENING FOR METABOLIC DISORDER: ICD-10-CM

## 2019-11-07 DIAGNOSIS — Z13.6 ENCOUNTER FOR LIPID SCREENING FOR CARDIOVASCULAR DISEASE: ICD-10-CM

## 2019-11-07 DIAGNOSIS — Z13.220 ENCOUNTER FOR LIPID SCREENING FOR CARDIOVASCULAR DISEASE: ICD-10-CM

## 2019-11-07 PROCEDURE — 99396 PREV VISIT EST AGE 40-64: CPT | Mod: 25 | Performed by: OBSTETRICS & GYNECOLOGY

## 2019-11-07 PROCEDURE — 77067 SCR MAMMO BI INCL CAD: CPT | Mod: TC

## 2019-11-07 PROCEDURE — 80061 LIPID PANEL: CPT | Performed by: OBSTETRICS & GYNECOLOGY

## 2019-11-07 PROCEDURE — 80053 COMPREHEN METABOLIC PANEL: CPT | Performed by: OBSTETRICS & GYNECOLOGY

## 2019-11-07 PROCEDURE — 36415 COLL VENOUS BLD VENIPUNCTURE: CPT | Performed by: OBSTETRICS & GYNECOLOGY

## 2019-11-07 PROCEDURE — 77063 BREAST TOMOSYNTHESIS BI: CPT | Mod: TC

## 2019-11-07 PROCEDURE — 90682 RIV4 VACC RECOMBINANT DNA IM: CPT | Performed by: OBSTETRICS & GYNECOLOGY

## 2019-11-07 PROCEDURE — 90471 IMMUNIZATION ADMIN: CPT | Performed by: OBSTETRICS & GYNECOLOGY

## 2019-11-07 ASSESSMENT — MIFFLIN-ST. JEOR: SCORE: 1348.29

## 2019-11-07 ASSESSMENT — ANXIETY QUESTIONNAIRES
IF YOU CHECKED OFF ANY PROBLEMS ON THIS QUESTIONNAIRE, HOW DIFFICULT HAVE THESE PROBLEMS MADE IT FOR YOU TO DO YOUR WORK, TAKE CARE OF THINGS AT HOME, OR GET ALONG WITH OTHER PEOPLE: NOT DIFFICULT AT ALL
7. FEELING AFRAID AS IF SOMETHING AWFUL MIGHT HAPPEN: NOT AT ALL
5. BEING SO RESTLESS THAT IT IS HARD TO SIT STILL: NOT AT ALL
GAD7 TOTAL SCORE: 0
6. BECOMING EASILY ANNOYED OR IRRITABLE: NOT AT ALL
2. NOT BEING ABLE TO STOP OR CONTROL WORRYING: NOT AT ALL
3. WORRYING TOO MUCH ABOUT DIFFERENT THINGS: NOT AT ALL
1. FEELING NERVOUS, ANXIOUS, OR ON EDGE: NOT AT ALL

## 2019-11-07 ASSESSMENT — PATIENT HEALTH QUESTIONNAIRE - PHQ9
SUM OF ALL RESPONSES TO PHQ QUESTIONS 1-9: 0
5. POOR APPETITE OR OVEREATING: NOT AT ALL

## 2019-11-07 NOTE — PATIENT INSTRUCTIONS
-Daily calcium intake should be 1200mg or 5 servings of calcium containing foods; VItamin D 800IU. This is best gained through diet, such as dairy products, leafy green vegetables, soy milk, almond milk, etc. If adequate amount not taken in diet, then a supplement may be needed.     -I also recommend increasing your dietary fiber by starting Metamucil (powder mixed in glass of water) twice daily

## 2019-11-08 LAB
ALBUMIN SERPL-MCNC: 3.8 G/DL (ref 3.4–5)
ALP SERPL-CCNC: 54 U/L (ref 40–150)
ALT SERPL W P-5'-P-CCNC: 21 U/L (ref 0–50)
ANION GAP SERPL CALCULATED.3IONS-SCNC: 6 MMOL/L (ref 3–14)
AST SERPL W P-5'-P-CCNC: 10 U/L (ref 0–45)
BILIRUB SERPL-MCNC: 0.4 MG/DL (ref 0.2–1.3)
BUN SERPL-MCNC: 13 MG/DL (ref 7–30)
CALCIUM SERPL-MCNC: 8.9 MG/DL (ref 8.5–10.1)
CHLORIDE SERPL-SCNC: 107 MMOL/L (ref 94–109)
CHOLEST SERPL-MCNC: 164 MG/DL
CO2 SERPL-SCNC: 24 MMOL/L (ref 20–32)
CREAT SERPL-MCNC: 0.67 MG/DL (ref 0.52–1.04)
GFR SERPL CREATININE-BSD FRML MDRD: >90 ML/MIN/{1.73_M2}
GLUCOSE SERPL-MCNC: 85 MG/DL (ref 70–99)
HDLC SERPL-MCNC: 73 MG/DL
LDLC SERPL CALC-MCNC: 83 MG/DL
NONHDLC SERPL-MCNC: 91 MG/DL
POTASSIUM SERPL-SCNC: 4.3 MMOL/L (ref 3.4–5.3)
PROT SERPL-MCNC: 7.8 G/DL (ref 6.8–8.8)
SODIUM SERPL-SCNC: 137 MMOL/L (ref 133–144)
TRIGL SERPL-MCNC: 40 MG/DL

## 2019-11-08 ASSESSMENT — ANXIETY QUESTIONNAIRES: GAD7 TOTAL SCORE: 0

## 2020-12-24 NOTE — PROGRESS NOTES
Susan is a 55 year old  female who presents for annual exam.     Besides routine health maintenance, she has no other health concerns today .    HPI:    LMP 2020.  Has been feeling occ hot flashes in the last month, caren at night.     The patient's PCP is Krystyna Roque.      GYNECOLOGIC HISTORY:    No LMP recorded. Patient is perimenopausal.    Regular menses? no  Menses every 3 months or more.  Length of menses: 3 days    Her current contraception method is: vasectomy.  She  reports that she has never smoked. She has never used smokeless tobacco.  Patient is sexually active.    Last PHQ-9 score on record =   PHQ-9 SCORE 2019   PHQ-9 Total Score 0     Last GAD7 score on record =   BETH-7 SCORE 2020   Total Score 0     Alcohol Score = 2    HEALTH MAINTENANCE:  Cholesterol:   Recent Labs   Lab Test 19  0945 17  0948   CHOL 164 155   HDL 73 76   LDL 83 71   TRIG 40 42   FBS 85 72     TSH   Date Value Ref Range Status   2017 0.86 0.40 - 4.00 mU/L Final     Last Mammo: 19, Result: Normal, Next Mammo: Today  Pap:   Lab Results   Component Value Date    PAP NIL, NEG-HPV 2017    PAP Negative 2012    PAP Negative 2011     Colonoscopy:  2011, Result: Normal, Next Colonoscopy: 10 year  Dexa:  never  Health maintenance updated:  Immunizations reviewed, discussed shingles vaccine    HISTORY:  OB History    Para Term  AB Living   5 4 4 0 1 4   SAB TAB Ectopic Multiple Live Births   1 0 0 0 4      # Outcome Date GA Lbr Remigio/2nd Weight Sex Delivery Anes PTL Lv   5 Term 02 39w0d  3.856 kg (8 lb 8 oz) M    NYA      Name: HOMA   4 Term 00 38w0d  3.572 kg (7 lb 14 oz) F    NYA      Name: BRAYDON   3 Term 01/15/99 38w0d  3.147 kg (6 lb 15 oz) M    NYA      Name: ANN   2 Term 97 39w0d  4.139 kg (9 lb 2 oz) M    NYA      Name: JOSÉ MIGUEL   1 SAB                There is no problem list on file for this patient.    Past Surgical  "History:   Procedure Laterality Date     ENDOMETRIAL SAMPLING (BIOPSY)  1/26/10    FOR ENDO CELLS ON PAP--SECRETORY ENDO      Social History     Tobacco Use     Smoking status: Never Smoker     Smokeless tobacco: Never Used   Substance Use Topics     Alcohol use: Yes     Alcohol/week: 0.0 standard drinks      Problem (# of Occurrences) Relation (Name,Age of Onset)    Breast Cancer (1) Mother    Colon Cancer (1) Maternal Grandfather    Hyperlipidemia (1) Father    Hypertension (1) Other            Current Outpatient Medications   Medication Sig     multivitamin, therapeutic with minerals (MULTI-VITAMIN) TABS Take 1 tablet by mouth daily     Omega-3 Fatty Acids (OMEGA-3 FISH OIL PO)      VITAMIN D, CHOLECALCIFEROL, PO Take by mouth daily     No current facility-administered medications for this visit.      Allergies   Allergen Reactions     Amoxicillin Rash       Past medical, surgical, social and family histories were reviewed and updated in EPIC.    ROS:   12 point review of systems negative other than symptoms noted below or in the HPI.      EXAM:  BP 98/60   Ht 1.734 m (5' 8.25\")   Wt 67.6 kg (149 lb)   BMI 22.49 kg/m     BMI: Body mass index is 22.49 kg/m .    PHYSICAL EXAM:  Constitutional:   Appearance: Well nourished, well developed, alert, in no acute distress  Neck:  Lymph Nodes:  No lymphadenopathy present    Thyroid:  Gland size normal, nontender, no nodules or masses present  on palpation  Chest:  Respiratory Effort:  Breathing unlabored  Cardiovascular:    Heart: Auscultation:  Regular rate, normal rhythm, no murmurs present  Breasts: Inspection of Breasts:  No lymphadenopathy present., Palpation of Breasts and Axillae:  No masses present on palpation, no breast tenderness., Axillary Lymph Nodes:  No lymphadenopathy present. and No nodularity, asymmetry or nipple discharge bilaterally.  Gastrointestinal:   Abdominal Examination:  Abdomen nontender to palpation, tone normal without rigidity or " guarding, no masses present, umbilicus without lesions   Liver and Spleen:  No hepatomegaly present, liver nontender to palpation    Hernias:  No hernias present  Lymphatic: Lymph Nodes:  No other lymphadenopathy present  Skin:  General Inspection:  No rashes present, no lesions present, no areas of  discoloration  Neurologic:    Mental Status:  Oriented X3.  Normal strength and tone, sensory exam                grossly normal, mentation intact and speech normal.    Psychiatric:   Mentation appears normal and affect normal/bright.         Pelvic Exam:  External Genitalia:     Normal appearance for age, no discharge present, no tenderness present, no inflammatory lesions present, color normal  Vagina:     Normal vaginal vault without central or paravaginal defects, no discharge present, no inflammatory lesions present, no masses present  Bladder:     Nontender to palpation  Urethra:   Urethral Body:  Urethra palpation normal, urethra structural support normal   Urethral Meatus:  No erythema or lesions present  Cervix:     Appearance healthy, no lesions present, nontender to palpation, no bleeding present  Uterus:     Uterus: firm, normal sized and nontender, retroverted in position.   Adnexa:     No adnexal tenderness present, no adnexal masses present  Perineum:     Perineum within normal limits, no evidence of trauma, no rashes or skin lesions present  Anus:     Anus within normal limits, no hemorrhoids present  Inguinal Lymph Nodes:     No lymphadenopathy present  Pubic Hair:     Normal pubic hair distribution for age  Genitalia and Groin:     No rashes present, no lesions present, no areas of discoloration, no masses present      COUNSELING:   Reviewed preventive health counseling, as reflected in patient instructions       Osteoporosis prevention/bone health    BMI: Body mass index is 22.49 kg/m .      ASSESSMENT:  55 year old female with satisfactory annual exam.    ICD-10-CM    1. Encntr for gyn exam (general)  (routine) w/o abn findings  Z01.419        PLAN:  -UTD for cervical cancer screening. Reviewed guidelines-pap q 3yrs until age 30 when co-testing q 5 years.  -Breast self awareness discussed. Due for mammogram.  -Discussed exercise-making plan, strength training. Nutrition encouraged.  -Colonoscopy due 2021, pt will schedule  -Osteoporosis prevention discussed.  -PCp manages labs  -Perimenopausal/menopausal diagnosis, symptoms, management discussed  -Return one year for next annual exam          Annette Gallo Masters, DO

## 2020-12-28 ENCOUNTER — ANCILLARY PROCEDURE (OUTPATIENT)
Dept: MAMMOGRAPHY | Facility: CLINIC | Age: 55
End: 2020-12-28
Payer: COMMERCIAL

## 2020-12-28 ENCOUNTER — OFFICE VISIT (OUTPATIENT)
Dept: OBGYN | Facility: CLINIC | Age: 55
End: 2020-12-28
Payer: COMMERCIAL

## 2020-12-28 VITALS
BODY MASS INDEX: 22.58 KG/M2 | DIASTOLIC BLOOD PRESSURE: 60 MMHG | WEIGHT: 149 LBS | HEIGHT: 68 IN | SYSTOLIC BLOOD PRESSURE: 98 MMHG

## 2020-12-28 DIAGNOSIS — Z12.31 VISIT FOR SCREENING MAMMOGRAM: ICD-10-CM

## 2020-12-28 DIAGNOSIS — Z01.419 ENCNTR FOR GYN EXAM (GENERAL) (ROUTINE) W/O ABN FINDINGS: Primary | ICD-10-CM

## 2020-12-28 PROCEDURE — 99396 PREV VISIT EST AGE 40-64: CPT | Performed by: OBSTETRICS & GYNECOLOGY

## 2020-12-28 PROCEDURE — 77063 BREAST TOMOSYNTHESIS BI: CPT | Mod: TC | Performed by: RADIOLOGY

## 2020-12-28 PROCEDURE — 77067 SCR MAMMO BI INCL CAD: CPT | Mod: TC | Performed by: RADIOLOGY

## 2020-12-28 ASSESSMENT — ANXIETY QUESTIONNAIRES
5. BEING SO RESTLESS THAT IT IS HARD TO SIT STILL: NOT AT ALL
1. FEELING NERVOUS, ANXIOUS, OR ON EDGE: NOT AT ALL
6. BECOMING EASILY ANNOYED OR IRRITABLE: NOT AT ALL
IF YOU CHECKED OFF ANY PROBLEMS ON THIS QUESTIONNAIRE, HOW DIFFICULT HAVE THESE PROBLEMS MADE IT FOR YOU TO DO YOUR WORK, TAKE CARE OF THINGS AT HOME, OR GET ALONG WITH OTHER PEOPLE: NOT DIFFICULT AT ALL
2. NOT BEING ABLE TO STOP OR CONTROL WORRYING: NOT AT ALL
GAD7 TOTAL SCORE: 0
3. WORRYING TOO MUCH ABOUT DIFFERENT THINGS: NOT AT ALL
7. FEELING AFRAID AS IF SOMETHING AWFUL MIGHT HAPPEN: NOT AT ALL

## 2020-12-28 ASSESSMENT — MIFFLIN-ST. JEOR: SCORE: 1323.33

## 2020-12-28 ASSESSMENT — PATIENT HEALTH QUESTIONNAIRE - PHQ9: 5. POOR APPETITE OR OVEREATING: NOT AT ALL

## 2020-12-28 NOTE — PATIENT INSTRUCTIONS
-Daily total calcium intake (between food/supplements) should be 1200mg which equates to 5 servings calcium containing food per day; VItamin D 1000IU.   Foods rich in calcium are: milk, cheese, yogurt, seafood, sardines and canned salmon, leafy green vegetables such as aleksey greens, spinach and kale, beans and lentils, almonds, seeds (poppy, sesame, celery, shelly), rhubarb, dried fruit such as figs, whey protein, tofu and edamame, amaranth, other foods with added calcium such as orange juice and some cereals.   If adequate amount not taken in diet, then a supplement may be needed.     -I also recommend increasing your dietary fiber by starting Metamucil (powder mixed in glass of water) twice daily

## 2020-12-29 ASSESSMENT — ANXIETY QUESTIONNAIRES: GAD7 TOTAL SCORE: 0

## 2021-04-10 ENCOUNTER — IMMUNIZATION (OUTPATIENT)
Dept: NURSING | Facility: CLINIC | Age: 56
End: 2021-04-10
Payer: COMMERCIAL

## 2021-04-10 PROCEDURE — 91301 PR COVID VAC MODERNA 100 MCG/0.5 ML IM: CPT

## 2021-04-10 PROCEDURE — 0011A PR COVID VAC MODERNA 100 MCG/0.5 ML IM: CPT

## 2021-05-08 ENCOUNTER — IMMUNIZATION (OUTPATIENT)
Dept: NURSING | Facility: CLINIC | Age: 56
End: 2021-05-08
Attending: INTERNAL MEDICINE
Payer: COMMERCIAL

## 2021-05-08 PROCEDURE — 0012A PR COVID VAC MODERNA 100 MCG/0.5 ML IM: CPT

## 2021-05-08 PROCEDURE — 91301 PR COVID VAC MODERNA 100 MCG/0.5 ML IM: CPT

## 2021-09-04 ENCOUNTER — HEALTH MAINTENANCE LETTER (OUTPATIENT)
Age: 56
End: 2021-09-04

## 2022-01-26 DIAGNOSIS — Z11.59 ENCOUNTER FOR SCREENING FOR OTHER VIRAL DISEASES: Primary | ICD-10-CM

## 2022-02-01 ENCOUNTER — LAB (OUTPATIENT)
Dept: LAB | Facility: CLINIC | Age: 57
End: 2022-02-01
Attending: COLON & RECTAL SURGERY
Payer: COMMERCIAL

## 2022-02-01 DIAGNOSIS — Z11.59 ENCOUNTER FOR SCREENING FOR OTHER VIRAL DISEASES: ICD-10-CM

## 2022-02-01 PROCEDURE — U0003 INFECTIOUS AGENT DETECTION BY NUCLEIC ACID (DNA OR RNA); SEVERE ACUTE RESPIRATORY SYNDROME CORONAVIRUS 2 (SARS-COV-2) (CORONAVIRUS DISEASE [COVID-19]), AMPLIFIED PROBE TECHNIQUE, MAKING USE OF HIGH THROUGHPUT TECHNOLOGIES AS DESCRIBED BY CMS-2020-01-R: HCPCS

## 2022-02-01 PROCEDURE — U0005 INFEC AGEN DETEC AMPLI PROBE: HCPCS

## 2022-02-02 LAB — SARS-COV-2 RNA RESP QL NAA+PROBE: NEGATIVE

## 2022-02-04 ENCOUNTER — HOSPITAL ENCOUNTER (OUTPATIENT)
Facility: CLINIC | Age: 57
Discharge: HOME | End: 2022-02-04
Attending: COLON & RECTAL SURGERY | Admitting: COLON & RECTAL SURGERY
Payer: COMMERCIAL

## 2022-02-04 VITALS
RESPIRATION RATE: 20 BRPM | SYSTOLIC BLOOD PRESSURE: 99 MMHG | BODY MASS INDEX: 21.92 KG/M2 | DIASTOLIC BLOOD PRESSURE: 77 MMHG | WEIGHT: 148 LBS | HEART RATE: 67 BPM | OXYGEN SATURATION: 99 % | HEIGHT: 69 IN

## 2022-02-04 LAB — COLONOSCOPY: NORMAL

## 2022-02-04 PROCEDURE — 45385 COLONOSCOPY W/LESION REMOVAL: CPT | Mod: PT | Performed by: COLON & RECTAL SURGERY

## 2022-02-04 PROCEDURE — 250N000011 HC RX IP 250 OP 636: Performed by: COLON & RECTAL SURGERY

## 2022-02-04 PROCEDURE — 99153 MOD SED SAME PHYS/QHP EA: CPT | Performed by: COLON & RECTAL SURGERY

## 2022-02-04 PROCEDURE — G0500 MOD SEDAT ENDO SERVICE >5YRS: HCPCS | Performed by: COLON & RECTAL SURGERY

## 2022-02-04 PROCEDURE — 88305 TISSUE EXAM BY PATHOLOGIST: CPT | Mod: TC | Performed by: COLON & RECTAL SURGERY

## 2022-02-04 RX ORDER — LIDOCAINE 40 MG/G
CREAM TOPICAL
Status: CANCELLED | OUTPATIENT
Start: 2022-02-04

## 2022-02-04 RX ORDER — ONDANSETRON 2 MG/ML
4 INJECTION INTRAMUSCULAR; INTRAVENOUS EVERY 6 HOURS PRN
Status: DISCONTINUED | OUTPATIENT
Start: 2022-02-04 | End: 2022-02-04 | Stop reason: HOSPADM

## 2022-02-04 RX ORDER — FENTANYL CITRATE 50 UG/ML
INJECTION, SOLUTION INTRAMUSCULAR; INTRAVENOUS PRN
Status: COMPLETED | OUTPATIENT
Start: 2022-02-04 | End: 2022-02-04

## 2022-02-04 RX ORDER — ONDANSETRON 4 MG/1
4 TABLET, ORALLY DISINTEGRATING ORAL EVERY 6 HOURS PRN
Status: DISCONTINUED | OUTPATIENT
Start: 2022-02-04 | End: 2022-02-04 | Stop reason: HOSPADM

## 2022-02-04 RX ORDER — ONDANSETRON 2 MG/ML
4 INJECTION INTRAMUSCULAR; INTRAVENOUS
Status: CANCELLED | OUTPATIENT
Start: 2022-02-04

## 2022-02-04 RX ORDER — NALOXONE HYDROCHLORIDE 0.4 MG/ML
0.2 INJECTION, SOLUTION INTRAMUSCULAR; INTRAVENOUS; SUBCUTANEOUS
Status: DISCONTINUED | OUTPATIENT
Start: 2022-02-04 | End: 2022-02-04 | Stop reason: HOSPADM

## 2022-02-04 RX ORDER — NALOXONE HYDROCHLORIDE 0.4 MG/ML
0.4 INJECTION, SOLUTION INTRAMUSCULAR; INTRAVENOUS; SUBCUTANEOUS
Status: DISCONTINUED | OUTPATIENT
Start: 2022-02-04 | End: 2022-02-04 | Stop reason: HOSPADM

## 2022-02-04 RX ORDER — FLUMAZENIL 0.1 MG/ML
0.2 INJECTION, SOLUTION INTRAVENOUS
Status: DISCONTINUED | OUTPATIENT
Start: 2022-02-04 | End: 2022-02-04 | Stop reason: HOSPADM

## 2022-02-04 RX ORDER — PROCHLORPERAZINE MALEATE 10 MG
10 TABLET ORAL EVERY 6 HOURS PRN
Status: DISCONTINUED | OUTPATIENT
Start: 2022-02-04 | End: 2022-02-04 | Stop reason: HOSPADM

## 2022-02-04 RX ADMIN — MIDAZOLAM 2 MG: 1 INJECTION INTRAMUSCULAR; INTRAVENOUS at 12:22

## 2022-02-04 RX ADMIN — FENTANYL CITRATE 100 MCG: 50 INJECTION, SOLUTION INTRAMUSCULAR; INTRAVENOUS at 12:22

## 2022-02-04 RX ADMIN — MIDAZOLAM 1 MG: 1 INJECTION INTRAMUSCULAR; INTRAVENOUS at 12:29

## 2022-02-04 ASSESSMENT — MIFFLIN-ST. JEOR: SCORE: 1317.76

## 2022-02-04 NOTE — H&P
"Pre-Endoscopy History and Physical     Susan Reyes MRN# [id number]   YOB: 1965 Age: 56 year old     Date of Procedure: 2/4/2022  Primary care provider: Krystyna Roque  Type of Endoscopy: Colonoscopy  Reason for Procedure: Screening  Type of Anesthesia Anticipated: Moderate Sedation    HPI:    Susan is a 56 year old female who will be undergoing the above procedure.      A history and physical has been performed. The patient's medications and allergies have been reviewed. The risks and benefits of the procedure and the sedation options and risks were discussed with the patient.  All questions were answered and informed consent was obtained.      She denies a personal or family history of anesthesia complications or bleeding disorders.     Allergies   Allergen Reactions     Amoxicillin Rash        No current facility-administered medications on file prior to encounter.  multivitamin, therapeutic with minerals (MULTI-VITAMIN) TABS, Take 1 tablet by mouth daily  Omega-3 Fatty Acids (OMEGA-3 FISH OIL PO),   VITAMIN D, CHOLECALCIFEROL, PO, Take by mouth daily        There is no problem list on file for this patient.       Past Medical History:   Diagnosis Date     NO ACTIVE PROBLEMS         Past Surgical History:   Procedure Laterality Date     ENDOMETRIAL SAMPLING (BIOPSY)  1/26/10    FOR ENDO CELLS ON PAP--SECRETORY ENDO       Social History     Tobacco Use     Smoking status: Never Smoker     Smokeless tobacco: Never Used   Substance Use Topics     Alcohol use: Yes     Alcohol/week: 0.0 standard drinks       Family History   Problem Relation Age of Onset     Breast Cancer Mother      Colon Cancer Maternal Grandfather      Hyperlipidemia Father      Hypertension Other        REVIEW OF SYSTEMS:     5 point ROS negative except as noted above in HPI, including Gen., Resp., CV, GI &  system review.      PHYSICAL EXAM:   /82   Pulse 77   Ht 1.74 m (5' 8.5\")   Wt 67.1 kg (148 lb)   SpO2 98%   BMI " "22.18 kg/m   Estimated body mass index is 22.18 kg/m  as calculated from the following:    Height as of this encounter: 1.74 m (5' 8.5\").    Weight as of this encounter: 67.1 kg (148 lb).   GENERAL APPEARANCE: healthy and alert  MENTAL STATUS: alert  AIRWAY EXAM: Mallampatti Class I (visualization of the soft palate, fauces, uvula, anterior and posterior pillars)  RESP: lungs clear to auscultation - no rales, rhonchi or wheezes  CV: regular rates and rhythm      IMPRESSION   ASA Class 2 - Mild systemic disease        PLAN:     Plan for colonoscopy. We discussed the risks, benefits and alternatives and the patient wished to proceed.    The above has been forwarded to the consulting provider.      Malgorzata Mott MD  [facility]  Phone: [phone]  Fax: [phone]  February 4, 2022    "

## 2022-02-07 LAB
PATH REPORT.COMMENTS IMP SPEC: NORMAL
PATH REPORT.COMMENTS IMP SPEC: NORMAL
PATH REPORT.FINAL DX SPEC: NORMAL
PATH REPORT.GROSS SPEC: NORMAL
PATH REPORT.MICROSCOPIC SPEC OTHER STN: NORMAL
PATH REPORT.RELEVANT HX SPEC: NORMAL
PHOTO IMAGE: NORMAL

## 2022-02-07 PROCEDURE — 88305 TISSUE EXAM BY PATHOLOGIST: CPT | Mod: 26 | Performed by: PATHOLOGY

## 2022-02-19 ENCOUNTER — HEALTH MAINTENANCE LETTER (OUTPATIENT)
Age: 57
End: 2022-02-19

## 2022-03-31 NOTE — PROGRESS NOTES
Susan is a 56 year old  female who presents for annual exam.     Besides routine health maintenance, she has no other health concerns today .    HPI:  The patient's PCP is  Krystyna Roque.      No vb/discharge. NO period in over a year.     Has some hot flashes on/off. Disrupted sleep but working on it.     GYNECOLOGIC HISTORY:    No LMP recorded. Patient is perimenopausal.      Her current contraception method is: menopause.  She  reports that she has never smoked. She has never used smokeless tobacco.    Patient is sexually active.  STD testing offered?  Declined  Last PHQ-9 score on record =   PHQ-9 SCORE 2022   PHQ-9 Total Score 0     Last GAD7 score on record =   BETH-7 SCORE 2022   Total Score 0     Alcohol Score = 2    HEALTH MAINTENANCE:  Cholesterol:   Recent Labs   Lab Test 19  0945 17  0948   CHOL 164 155   HDL 73 76   LDL 83 71   TRIG 40 42   Last Mammo: 2020, Result: Normal, Next Mammo: Today   Pap: (  Lab Results   Component Value Date    PAP NIL 2017    PAP Negative 2012    PAP Negative 2011      Colonoscopy:  2022, Result Two 4 to 6 mm polyps :  Next Colonoscopy: 3-5  years.  Dexa:  never    Health maintenance updated:  yes    HISTORY:  OB History    Para Term  AB Living   5 4 4 0 1 4   SAB IAB Ectopic Multiple Live Births   1 0 0 0 4      # Outcome Date GA Lbr Remigio/2nd Weight Sex Delivery Anes PTL Lv   5 Term 02 39w0d  3.856 kg (8 lb 8 oz) M    NYA      Name: HOMA   4 Term 00 38w0d  3.572 kg (7 lb 14 oz) F    NYA      Name: BRAYDON   3 Term 01/15/99 38w0d  3.147 kg (6 lb 15 oz) M    NYA      Name: ANN   2 Term 97 39w0d  4.139 kg (9 lb 2 oz) M    NYA      Name: JOSÉ MIGUEL   1 SAB                There is no problem list on file for this patient.    Past Surgical History:   Procedure Laterality Date     COLONOSCOPY N/A 2022    Procedure: COLONOSCOPY, FLEXIBLE, WITH LESION REMOVAL USING SNARE;   "Surgeon: Malena Mott MD;  Location:  GI     ENDOMETRIAL SAMPLING (BIOPSY)  1/26/10    FOR ENDO CELLS ON PAP--SECRETORY ENDO      Social History     Tobacco Use     Smoking status: Never Smoker     Smokeless tobacco: Never Used   Substance Use Topics     Alcohol use: Yes     Alcohol/week: 0.0 standard drinks      Problem (# of Occurrences) Relation (Name,Age of Onset)    Breast Cancer (1) Mother    Colon Cancer (1) Maternal Grandfather    Hyperlipidemia (1) Father    Hypertension (1) Other            Current Outpatient Medications   Medication Sig     Omega-3 Fatty Acids (OMEGA-3 FISH OIL PO)      VITAMIN D, CHOLECALCIFEROL, PO Take by mouth daily     No current facility-administered medications for this visit.     Allergies   Allergen Reactions     Amoxicillin Rash       Past medical, surgical, social and family histories were reviewed and updated in EPIC.    ROS:   12 point review of systems negative other than symptoms noted below or in the HPI.  No urinary frequency or dysuria, bladder or kidney problems    EXAM:  /74   Pulse 64   Ht 1.727 m (5' 8\")   Wt 68 kg (150 lb)   BMI 22.81 kg/m     BMI: Body mass index is 22.81 kg/m .    PHYSICAL EXAM:  Constitutional:   Appearance: Well nourished, well developed, alert, in no acute distress  Neck:  Lymph Nodes:  No lymphadenopathy present    Thyroid:  Gland size normal, nontender, no nodules or masses present  on palpation  Chest:  Respiratory Effort:  Breathing unlabored  Cardiovascular:    Heart: Auscultation:  Regular rate, normal rhythm, no murmurs present  Breasts: Inspection of Breasts:  No lymphadenopathy present., Palpation of Breasts and Axillae:  No masses present on palpation, no breast tenderness., Axillary Lymph Nodes:  No lymphadenopathy present. and No nodularity, asymmetry or nipple discharge bilaterally.  Gastrointestinal:   Abdominal Examination:  Abdomen nontender to palpation, tone normal without rigidity or guarding, no masses " present, umbilicus without lesions   Liver and Spleen:  No hepatomegaly present, liver nontender to palpation    Hernias:  No hernias present  Lymphatic: Lymph Nodes:  No other lymphadenopathy present  Skin:  General Inspection:  No rashes present, no lesions present, no areas of  discoloration  Neurologic:    Mental Status:  Oriented X3.  Normal strength and tone, sensory exam                grossly normal, mentation intact and speech normal.    Psychiatric:   Mentation appears normal and affect normal/bright.         Pelvic Exam:  External Genitalia:     Normal appearance for age, no discharge present, no tenderness present, no inflammatory lesions present, color normal  Vagina:     Normal vaginal vault without central or paravaginal defects, no discharge present, no inflammatory lesions present, no masses present  Bladder:     Nontender to palpation  Urethra:   Urethral Body:  Urethra palpation normal, urethra structural support normal   Urethral Meatus:  No erythema or lesions present  Cervix:     Appearance healthy, no lesions present, nontender to palpation, no bleeding present  Uterus:     Uterus: firm, normal sized and nontender, anteverted in position.   Adnexa:     No adnexal tenderness present, no adnexal masses present  Perineum:     Perineum within normal limits, no evidence of trauma, no rashes or skin lesions present  Anus:     Anus within normal limits, no hemorrhoids present  Inguinal Lymph Nodes:     No lymphadenopathy present  Pubic Hair:     Normal pubic hair distribution for age  Genitalia and Groin:     No rashes present, no lesions present, no areas of discoloration, no masses present      COUNSELING:   Reviewed preventive health counseling, as reflected in patient instructions       Osteoporosis prevention/bone health    BMI: Body mass index is 22.81 kg/m .      ASSESSMENT:  56 year old female with satisfactory annual exam.    ICD-10-CM    1. Encounter for gynecological examination without  abnormal finding  Z01.419 Pap thin layer screen with HPV - recommended age 30 - 65 years       PLAN:  -Pap/hpv obtained for cervical cancer screening. Reviewed guidelines-pap q 3yrs until age 30 when co-testing q 5 years.  -Breast self awareness discussed. UTD for mammogram.  -Colonoscopy UTD  -Osteoporosis prevention discussed.  -PMB precautions  -Return one year for next annual exam          Annette Freemans, DO

## 2022-04-01 ENCOUNTER — ANCILLARY PROCEDURE (OUTPATIENT)
Dept: MAMMOGRAPHY | Facility: CLINIC | Age: 57
End: 2022-04-01
Payer: COMMERCIAL

## 2022-04-01 ENCOUNTER — OFFICE VISIT (OUTPATIENT)
Dept: OBGYN | Facility: CLINIC | Age: 57
End: 2022-04-01
Payer: COMMERCIAL

## 2022-04-01 VITALS
DIASTOLIC BLOOD PRESSURE: 74 MMHG | WEIGHT: 150 LBS | SYSTOLIC BLOOD PRESSURE: 114 MMHG | BODY MASS INDEX: 22.73 KG/M2 | HEART RATE: 64 BPM | HEIGHT: 68 IN

## 2022-04-01 DIAGNOSIS — Z12.31 VISIT FOR SCREENING MAMMOGRAM: ICD-10-CM

## 2022-04-01 DIAGNOSIS — Z01.419 ENCOUNTER FOR GYNECOLOGICAL EXAMINATION WITHOUT ABNORMAL FINDING: Primary | ICD-10-CM

## 2022-04-01 PROCEDURE — 87624 HPV HI-RISK TYP POOLED RSLT: CPT | Performed by: OBSTETRICS & GYNECOLOGY

## 2022-04-01 PROCEDURE — 77063 BREAST TOMOSYNTHESIS BI: CPT | Mod: TC | Performed by: RADIOLOGY

## 2022-04-01 PROCEDURE — G0145 SCR C/V CYTO,THINLAYER,RESCR: HCPCS | Performed by: OBSTETRICS & GYNECOLOGY

## 2022-04-01 PROCEDURE — 77067 SCR MAMMO BI INCL CAD: CPT | Mod: TC | Performed by: RADIOLOGY

## 2022-04-01 PROCEDURE — 99396 PREV VISIT EST AGE 40-64: CPT | Performed by: OBSTETRICS & GYNECOLOGY

## 2022-04-01 ASSESSMENT — PATIENT HEALTH QUESTIONNAIRE - PHQ9
5. POOR APPETITE OR OVEREATING: NOT AT ALL
SUM OF ALL RESPONSES TO PHQ QUESTIONS 1-9: 0

## 2022-04-01 ASSESSMENT — ANXIETY QUESTIONNAIRES
3. WORRYING TOO MUCH ABOUT DIFFERENT THINGS: NOT AT ALL
5. BEING SO RESTLESS THAT IT IS HARD TO SIT STILL: NOT AT ALL
1. FEELING NERVOUS, ANXIOUS, OR ON EDGE: NOT AT ALL
7. FEELING AFRAID AS IF SOMETHING AWFUL MIGHT HAPPEN: NOT AT ALL
GAD7 TOTAL SCORE: 0
2. NOT BEING ABLE TO STOP OR CONTROL WORRYING: NOT AT ALL
IF YOU CHECKED OFF ANY PROBLEMS ON THIS QUESTIONNAIRE, HOW DIFFICULT HAVE THESE PROBLEMS MADE IT FOR YOU TO DO YOUR WORK, TAKE CARE OF THINGS AT HOME, OR GET ALONG WITH OTHER PEOPLE: NOT DIFFICULT AT ALL
6. BECOMING EASILY ANNOYED OR IRRITABLE: NOT AT ALL

## 2022-04-01 NOTE — PATIENT INSTRUCTIONS
-Daily total calcium intake (between food/supplements) should be 1200mg which equates to 5 servings calcium containing food per day; VItamin D 1000IU.   Foods rich in calcium are: milk, cheese, yogurt, seafood, sardines and canned salmon, leafy green vegetables such as aleksey greens, spinach and kale, beans and lentils, almonds, seeds (poppy, sesame, celery, shelly), rhubarb, dried fruit such as figs, whey protein, tofu and edamame, amaranth, other foods with added calcium such as orange juice and some cereals.   If adequate amount not taken in diet, then a supplement may be needed.     -I also recommend increasing your dietary fiber by starting Metamucil (powder mixed in glass of water) once to twice daily

## 2022-04-02 ASSESSMENT — ANXIETY QUESTIONNAIRES: GAD7 TOTAL SCORE: 0

## 2022-04-05 LAB
BKR LAB AP GYN ADEQUACY: NORMAL
BKR LAB AP GYN INTERPRETATION: NORMAL
BKR LAB AP HPV REFLEX: NORMAL
BKR LAB AP PREVIOUS ABNORMAL: NORMAL
PATH REPORT.COMMENTS IMP SPEC: NORMAL
PATH REPORT.COMMENTS IMP SPEC: NORMAL
PATH REPORT.RELEVANT HX SPEC: NORMAL

## 2022-04-08 LAB
HUMAN PAPILLOMA VIRUS 16 DNA: NEGATIVE
HUMAN PAPILLOMA VIRUS 18 DNA: NEGATIVE
HUMAN PAPILLOMA VIRUS FINAL DIAGNOSIS: NORMAL
HUMAN PAPILLOMA VIRUS OTHER HR: NEGATIVE

## 2022-10-16 ENCOUNTER — HEALTH MAINTENANCE LETTER (OUTPATIENT)
Age: 57
End: 2022-10-16

## 2022-10-19 ENCOUNTER — IMMUNIZATION (OUTPATIENT)
Dept: FAMILY MEDICINE | Facility: CLINIC | Age: 57
End: 2022-10-19
Payer: COMMERCIAL

## 2022-10-19 DIAGNOSIS — Z23 NEED FOR PROPHYLACTIC VACCINATION AND INOCULATION AGAINST INFLUENZA: Primary | ICD-10-CM

## 2022-10-19 PROCEDURE — 90682 RIV4 VACC RECOMBINANT DNA IM: CPT

## 2022-10-19 PROCEDURE — 90471 IMMUNIZATION ADMIN: CPT

## 2022-10-19 PROCEDURE — 99207 PR NO CHARGE NURSE ONLY: CPT

## 2023-04-10 NOTE — PROGRESS NOTES
Susan is a 57 year old  female who presents for annual exam.     Besides routine health maintenance, she has no other health concerns today .    HPI:  The patient's PCP is  Krystyna Roque.  Patient here today for her annual GYN exam.  She has her mammogram scheduled for later this month.  She is postmenopausal and does have hot flashes but no night sweats.  She does have some insomnia that comes and goes.  She does have questions about hormone replacement therapy.    Pap smear is up-to-date.      GYNECOLOGIC HISTORY:    No LMP recorded. Patient is perimenopausal.        Her current contraception method is: menopause.  She  reports that she has never smoked. She has never used smokeless tobacco.    Patient is sexually active.  STD testing offered?  Declined  Last PHQ-9 score on record =       2023    11:05 AM   PHQ-9 SCORE   PHQ-9 Total Score 0     Last GAD7 score on record =       2023    11:05 AM   BETH-7 SCORE   Total Score 0     Alcohol Score = 2    HEALTH MAINTENANCE:  Cholesterol: (  Cholesterol   Date Value Ref Range Status   2019 164 <200 mg/dL Final   2017 155 <200 mg/dL Final       Lab Results   Component Value Date    GYNINTERP Neg hpv 2022     Negative for Intraepithelial Lesion or Malignancy (NILM)    PAP NIL-neg hpv 2017    PAP Negative 2012    PAP Negative 2011       Health maintenance updated:  yes    Care Gaps    Overdue     Never   Done ADVANCE CARE PLANNING (Every 5 Years)     Never   Done HEPATITIS B IMMUNIZATION (1 of 3 - 3-dose series)     Never   Done HIV SCREENING (Once)     Never   Done HEPATITIS C SCREENING (Once)     2022 COVID-19 Vaccine (4 - Booster for Moderna series)  Last completed: Dec 14, 2021   DASIA 1   2023 PHQ-2 (once per calendar year) (Yearly, January to December)  Last completed:  YEARLY PREVENTIVE VISIT (Yearly)  Last completed: 2022     Upcoming     2024 MAMMO SCREENING (Every 2  Years)   Scheduled for:  LIPID (Every 5 Years)  Last completed:  COLORECTAL CANCER SCREENING (COLONOSCOPY - Required) (Every 3 Years)  Last completed:  HPV TEST (Once)  Last completed:  PAP (Every 5 Years)  Last completed:  DTAP/TDAP/TD IMMUNIZATION (2 - Td or Tdap)  Last completed: 2018           HISTORY:  OB History    Para Term  AB Living   5 4 4 0 1 4   SAB IAB Ectopic Multiple Live Births   1 0 0 0 4      # Outcome Date GA Lbr Remigio/2nd Weight Sex Delivery Anes PTL Lv   5 Term 02 39w0d  3.856 kg (8 lb 8 oz) M    NYA      Name: HOMA   4 Term 00 38w0d  3.572 kg (7 lb 14 oz) F    NYA      Name: BRAYDON   3 Term 01/15/99 38w0d  3.147 kg (6 lb 15 oz) M    NYA      Name: ANN   2 Term 97 39w0d  4.139 kg (9 lb 2 oz) M    NYA      Name: JOSÉ MIGUEL   1 SAB                There is no problem list on file for this patient.    Past Surgical History:   Procedure Laterality Date     COLONOSCOPY N/A 2022    Procedure: COLONOSCOPY, FLEXIBLE, WITH LESION REMOVAL USING SNARE;  Surgeon: Malena Mott MD;  Location:  GI     ENDOMETRIAL SAMPLING (BIOPSY)  1/26/10    FOR ENDO CELLS ON PAP--[facility]      Social History     Tobacco Use     Smoking status: Never     Smokeless tobacco: Never   Vaping Use     Vaping status: Not on file   Substance Use Topics     Alcohol use: Yes     Alcohol/week: 0.0 standard drinks of alcohol      Problem (# of Occurrences) Relation (Name,Age of Onset)    Hypertension (1) Other    Breast Cancer (1) Mother    Hyperlipidemia (1) Father    Colon Cancer (1) Maternal Grandfather            Current Outpatient Medications   Medication Sig     B Complex Vitamins (B COMPLEX-B12 PO)      Omega-3 Fatty Acids (OMEGA-3 FISH OIL PO)      Turmeric (QC TUMERIC COMPLEX PO)      VITAMIN D, CHOLECALCIFEROL, PO Take by mouth  "daily     No current facility-administered medications for this visit.     Allergies   Allergen Reactions     Amoxicillin Rash       Past medical, surgical, social and family histories were reviewed and updated in EPIC.    ROS:   12 point review of systems negative other than symptoms noted below or in the HPI.  No urinary frequency or dysuria, bladder or kidney problems    EXAM:  /72   Ht 1.725 m (5' 7.91\")   Wt 68 kg (150 lb)   BMI 22.87 kg/m     BMI: Body mass index is 22.87 kg/m .    PHYSICAL EXAM:  Constitutional:   Appearance: Well nourished, well developed, alert, in no acute distress  Neck:  Lymph Nodes:  No lymphadenopathy present    Thyroid:  Gland size normal, nontender, no nodules or masses present  on palpation  Chest:  Respiratory Effort:  Breathing unlabored  Cardiovascular:    Heart: Auscultation:  Regular rate, normal rhythm, no murmurs present  Breasts: Inspection of Breasts:  No lymphadenopathy present., Axillary Lymph Nodes:  No lymphadenopathy present., No nodularity, asymmetry or nipple discharge bilaterally. and Quarter size semisolid mobile nodule at the 1 o'clock position on the left breast about 6 cm from the nipple.  Gastrointestinal:   Abdominal Examination:  Abdomen nontender to palpation, tone normal without rigidity or guarding, no masses present, umbilicus without lesions   Liver and Spleen:  No hepatomegaly present, liver nontender to palpation    Hernias:  No hernias present  Lymphatic: Lymph Nodes:  No other lymphadenopathy present  Skin:  General Inspection:  No rashes present, no lesions present, no areas of  discoloration  Neurologic:    Mental Status:  Oriented X3.  Normal strength and tone, sensory exam                grossly normal, mentation intact and speech normal.    Psychiatric:   Mentation appears normal and affect normal/bright.         Pelvic Exam:  External Genitalia:     Normal appearance for age, no discharge present, no tenderness present, no inflammatory " lesions present, color normal  Vagina:     Normal vaginal vault without central or paravaginal defects, no discharge present, no inflammatory lesions present, no masses present  Bladder:     Nontender to palpation  Urethra:   Urethral Body:  Urethra palpation normal, urethra structural support normal   Urethral Meatus:  No erythema or lesions present  Cervix:     Appearance healthy, no lesions present, nontender to palpation, no bleeding present  Uterus:     Uterus: firm, normal sized and nontender, anteverted in position.   Adnexa:     No adnexal tenderness present, no adnexal masses present  Perineum:     Perineum within normal limits, no evidence of trauma, no rashes or skin lesions present  Anus:     Anus within normal limits, no hemorrhoids present  Inguinal Lymph Nodes:     No lymphadenopathy present  Pubic Hair:     Normal pubic hair distribution for age  Genitalia and Groin:     No rashes present, no lesions present, no areas of discoloration, no masses present      COUNSELING:   Special attention given to:        Regular exercise       Healthy diet/nutrition       (Brenda)menopause management    BMI: Body mass index is 22.87 kg/m .      ASSESSMENT:  57 year old female with satisfactory annual exam.    ICD-10-CM    1. Encounter for gynecological examination without abnormal finding  Z01.419       2. Breast lump on left side at 1 o'clock position  N63.21 MA Diagnostic Digital Bilateral     US Breast Left Limited 1-3 Quadrants          PLAN:  57-year-old postmenopausal female with a new left breast nodule.  We have asked her to cancel her screening mammogram and we will send her for diagnostic imaging complete with an ultrasound.  We had discussed hormone replacement therapy but will defer this conversation with the new breast lump.  Pap smear is up-to-date.    DANNY Yan CNP

## 2023-04-14 ENCOUNTER — OFFICE VISIT (OUTPATIENT)
Dept: OBGYN | Facility: CLINIC | Age: 58
End: 2023-04-14
Payer: COMMERCIAL

## 2023-04-14 VITALS
WEIGHT: 150 LBS | DIASTOLIC BLOOD PRESSURE: 72 MMHG | HEIGHT: 68 IN | SYSTOLIC BLOOD PRESSURE: 112 MMHG | BODY MASS INDEX: 22.73 KG/M2

## 2023-04-14 DIAGNOSIS — Z01.419 ENCOUNTER FOR GYNECOLOGICAL EXAMINATION WITHOUT ABNORMAL FINDING: Primary | ICD-10-CM

## 2023-04-14 DIAGNOSIS — N63.21 BREAST LUMP ON LEFT SIDE AT 1 O'CLOCK POSITION: ICD-10-CM

## 2023-04-14 PROCEDURE — 99213 OFFICE O/P EST LOW 20 MIN: CPT | Performed by: NURSE PRACTITIONER

## 2023-04-14 ASSESSMENT — PATIENT HEALTH QUESTIONNAIRE - PHQ9
5. POOR APPETITE OR OVEREATING: NOT AT ALL
SUM OF ALL RESPONSES TO PHQ QUESTIONS 1-9: 0

## 2023-04-14 ASSESSMENT — ANXIETY QUESTIONNAIRES
IF YOU CHECKED OFF ANY PROBLEMS ON THIS QUESTIONNAIRE, HOW DIFFICULT HAVE THESE PROBLEMS MADE IT FOR YOU TO DO YOUR WORK, TAKE CARE OF THINGS AT HOME, OR GET ALONG WITH OTHER PEOPLE: NOT DIFFICULT AT ALL
6. BECOMING EASILY ANNOYED OR IRRITABLE: NOT AT ALL
2. NOT BEING ABLE TO STOP OR CONTROL WORRYING: NOT AT ALL
3. WORRYING TOO MUCH ABOUT DIFFERENT THINGS: NOT AT ALL
7. FEELING AFRAID AS IF SOMETHING AWFUL MIGHT HAPPEN: NOT AT ALL
5. BEING SO RESTLESS THAT IT IS HARD TO SIT STILL: NOT AT ALL
GAD7 TOTAL SCORE: 0
1. FEELING NERVOUS, ANXIOUS, OR ON EDGE: NOT AT ALL
GAD7 TOTAL SCORE: 0

## 2023-04-20 ENCOUNTER — ANCILLARY PROCEDURE (OUTPATIENT)
Dept: MAMMOGRAPHY | Facility: CLINIC | Age: 58
End: 2023-04-20
Attending: NURSE PRACTITIONER
Payer: COMMERCIAL

## 2023-04-20 DIAGNOSIS — N63.21 BREAST LUMP ON LEFT SIDE AT 1 O'CLOCK POSITION: ICD-10-CM

## 2023-04-20 PROCEDURE — 77063 BREAST TOMOSYNTHESIS BI: CPT | Mod: GC | Performed by: STUDENT IN AN ORGANIZED HEALTH CARE EDUCATION/TRAINING PROGRAM

## 2023-04-20 PROCEDURE — 77067 SCR MAMMO BI INCL CAD: CPT | Mod: GC | Performed by: STUDENT IN AN ORGANIZED HEALTH CARE EDUCATION/TRAINING PROGRAM

## 2023-04-20 PROCEDURE — 76642 ULTRASOUND BREAST LIMITED: CPT | Mod: LT | Performed by: STUDENT IN AN ORGANIZED HEALTH CARE EDUCATION/TRAINING PROGRAM

## 2023-06-01 ENCOUNTER — HEALTH MAINTENANCE LETTER (OUTPATIENT)
Age: 58
End: 2023-06-01

## 2024-07-18 ENCOUNTER — ANCILLARY PROCEDURE (OUTPATIENT)
Dept: MAMMOGRAPHY | Facility: CLINIC | Age: 59
End: 2024-07-18
Payer: COMMERCIAL

## 2024-07-18 ENCOUNTER — OFFICE VISIT (OUTPATIENT)
Dept: OBGYN | Facility: CLINIC | Age: 59
End: 2024-07-18
Payer: COMMERCIAL

## 2024-07-18 VITALS
WEIGHT: 148 LBS | SYSTOLIC BLOOD PRESSURE: 122 MMHG | BODY MASS INDEX: 23.23 KG/M2 | DIASTOLIC BLOOD PRESSURE: 70 MMHG | HEIGHT: 67 IN

## 2024-07-18 DIAGNOSIS — Z01.419 ENCOUNTER FOR GYNECOLOGICAL EXAMINATION WITHOUT ABNORMAL FINDING: Primary | ICD-10-CM

## 2024-07-18 DIAGNOSIS — Z80.3 FAMILY HISTORY OF BREAST CANCER: ICD-10-CM

## 2024-07-18 DIAGNOSIS — Z12.31 VISIT FOR SCREENING MAMMOGRAM: ICD-10-CM

## 2024-07-18 PROCEDURE — 99396 PREV VISIT EST AGE 40-64: CPT | Performed by: OBSTETRICS & GYNECOLOGY

## 2024-07-18 PROCEDURE — 77067 SCR MAMMO BI INCL CAD: CPT | Mod: TC | Performed by: RADIOLOGY

## 2024-07-18 PROCEDURE — 77063 BREAST TOMOSYNTHESIS BI: CPT | Mod: TC | Performed by: RADIOLOGY

## 2024-07-18 RX ORDER — TRIAMCINOLONE ACETONIDE 55 UG/1
2 SPRAY, METERED NASAL DAILY
COMMUNITY
Start: 2024-06-27

## 2024-07-18 ASSESSMENT — PATIENT HEALTH QUESTIONNAIRE - PHQ9
SUM OF ALL RESPONSES TO PHQ QUESTIONS 1-9: 0
5. POOR APPETITE OR OVEREATING: NOT AT ALL

## 2024-07-18 ASSESSMENT — ANXIETY QUESTIONNAIRES
3. WORRYING TOO MUCH ABOUT DIFFERENT THINGS: NOT AT ALL
2. NOT BEING ABLE TO STOP OR CONTROL WORRYING: NOT AT ALL
7. FEELING AFRAID AS IF SOMETHING AWFUL MIGHT HAPPEN: NOT AT ALL
GAD7 TOTAL SCORE: 0
5. BEING SO RESTLESS THAT IT IS HARD TO SIT STILL: NOT AT ALL
GAD7 TOTAL SCORE: 0
IF YOU CHECKED OFF ANY PROBLEMS ON THIS QUESTIONNAIRE, HOW DIFFICULT HAVE THESE PROBLEMS MADE IT FOR YOU TO DO YOUR WORK, TAKE CARE OF THINGS AT HOME, OR GET ALONG WITH OTHER PEOPLE: NOT DIFFICULT AT ALL
1. FEELING NERVOUS, ANXIOUS, OR ON EDGE: NOT AT ALL
6. BECOMING EASILY ANNOYED OR IRRITABLE: NOT AT ALL

## 2024-07-18 NOTE — PROGRESS NOTES
Susan is a 58 year old  female who presents for women's health exam.     Besides routine health maintenance, she has no other health concerns today .    HPI:  The patient's PCP is Krystyna Roque. Last  for problem; physical  with labs. Nothing else scheduled.    Follows with Endocrinology for osteoporosis. Visit  telemed, Reclast recommended.   Does not want to do medications.     No vb/discharge.    Due for mammogram.     Is interested in genetic testing for family history breast ca and colon ca    No vb/discharge          GYNECOLOGIC HISTORY:    Patient's last menstrual period was 10/16/2019.        Her current contraception method is: vasectomy.  She  reports that she has never smoked. She has never used smokeless tobacco.    Patient is sexually active.  STD testing offered?  Declined  Last PHQ-9 score on record =       2024    11:06 AM   PHQ-9 SCORE   PHQ-9 Total Score 0     Last GAD7 score on record =       2024    11:06 AM   BETH-7 SCORE   Total Score 0     Alcohol Score = 2    HEALTH MAINTENANCE:  Care Gaps  Close care gaps  Overdue          Never done ADVANCE CARE PLANNING (Every 5 Years)     Never done HIV SCREENING (Once)     Never done HEPATITIS C SCREENING (Once)     Never done HEPATITIS B IMMUNIZATION (1 of 3 - 19+ 3-dose series)     2022 GLUCOSE (Every 3 Years)  Last completed:  YEARLY PREVENTIVE VISIT (Yearly)  Last completed: 2022     SEP 1  2023 COVID-19 Vaccine ( season)  Last completed: Dec 14, 2021     DASIA 1  2024 PHQ-2 (once per calendar year) (Yearly, January to December)  Last completed: 2023         Upcoming          SEP 1  2024 INFLUENZA VACCINE (1)  Last completed: Dec 1, 2023     NOV 7  2024 LIPID (Every 5 Years)  Last completed:  COLORECTAL CANCER SCREENING (COLONOSCOPY - Required) (Every 3 Years)  Last completed:  MAMMO SCREENING (Every 2 Years)    Scheduled for:  HPV TEST (Once)  Last completed:  PAP (Every 5 Years)  Last completed:  DTAP/TDAP/TD IMMUNIZATION (2 - Td or Tdap)  Last completed: 2018     Health maintenance updated:  yes    HISTORY:  OB History    Para Term  AB Living   5 4 4 0 1 4   SAB IAB Ectopic Multiple Live Births   1 0 0 0 4      # Outcome Date GA Lbr Remigio/2nd Weight Sex Type Anes PTL Lv   5 Term 02 39w0d  3.856 kg (8 lb 8 oz) M    NYA      Name: HOMA   4 Term 00 38w0d  3.572 kg (7 lb 14 oz) F    NYA      Name: BRAYDON   3 Term 01/15/99 38w0d  3.147 kg (6 lb 15 oz) M    NYA      Name: ANN   2 Term 97 39w0d  4.139 kg (9 lb 2 oz) M    NYA      Name: JOSÉ MIGUEL   1 SAB                There is no problem list on file for this patient.    Past Surgical History:   Procedure Laterality Date    COLONOSCOPY N/A 2022    Procedure: COLONOSCOPY, FLEXIBLE, WITH LESION REMOVAL USING SNARE;  Surgeon: Malena Mott MD;  Location:  GI    ENDOMETRIAL SAMPLING (BIOPSY)  1/26/10    FOR ENDO CELLS ON PAP--[facility]      Social History     Tobacco Use    Smoking status: Never    Smokeless tobacco: Never   Substance Use Topics    Alcohol use: Yes     Alcohol/week: 0.0 standard drinks of alcohol      Problem (# of Occurrences) Relation (Name,Age of Onset)    Parkinsonism (1) Mother    Cancer (1) Maternal Aunt: not sure breast or colon    Hypertension (2) Mother, Other    Breast Cancer (1) Mother (45)    Hyperlipidemia (1) Father    Colon Cancer (1) Maternal Grandfather (62)    Bladder Cancer (1) Mother              Current Outpatient Medications   Medication Sig Dispense Refill    B Complex Vitamins (B COMPLEX-B12 PO)       Magnesium Glycinate 120 MG CAPS       Omega-3 Fatty Acids (OMEGA-3 FISH OIL PO)       triamcinolone (NASACORT) 55 MCG/ACT nasal aerosol Spray 2 sprays into both nostrils daily      VITAMIN D,  "CHOLECALCIFEROL, PO Take by mouth daily      Turmeric (QC TUMERIC COMPLEX PO)        No current facility-administered medications for this visit.     Allergies   Allergen Reactions    Amoxicillin Rash       Past medical, surgical, social and family histories were reviewed and updated in EPIC.    ROS:   12 point review of systems negative other than symptoms noted below or in the HPI.  No urinary frequency or dysuria, bladder or kidney problems    EXAM:  /70   Ht 1.702 m (5' 7\")   Wt 67.1 kg (148 lb)   LMP 10/16/2019   BMI 23.18 kg/m     BMI: Body mass index is 23.18 kg/m .    PHYSICAL EXAM:  Constitutional:   Appearance: Well nourished, well developed, alert, in no acute distress  Neck:  Lymph Nodes:  No lymphadenopathy present    Thyroid:  Gland size normal, nontender, no nodules or masses present  on palpation  Chest:  Respiratory Effort:  Breathing unlabored  Cardiovascular:    Heart: Auscultation:  Regular rate, normal rhythm, no murmurs present  Breasts: Inspection of Breasts:  No lymphadenopathy present., Palpation of Breasts and Axillae:  No masses present on palpation, no breast tenderness., Axillary Lymph Nodes:  No lymphadenopathy present., and No nodularity, asymmetry or nipple discharge bilaterally.  Gastrointestinal:   Abdominal Examination:  Abdomen nontender to palpation, tone normal without rigidity or guarding, no masses present, umbilicus without lesions   Liver and Spleen:  No hepatomegaly present, liver nontender to palpation    Hernias:  No hernias present  Lymphatic: Lymph Nodes:  No other lymphadenopathy present  Skin:  General Inspection:  No rashes present, no lesions present, no areas of  discoloration  Neurologic:    Mental Status:  Oriented X3.  Normal strength and tone, sensory exam                grossly normal, mentation intact and speech normal.    Psychiatric:   Mentation appears normal and affect normal/bright.         Pelvic Exam:  External Genitalia:     Normal " appearance for age, no discharge present, no tenderness present, no inflammatory lesions present, color normal  Vagina:     Normal vaginal vault without central or paravaginal defects, no discharge present, no inflammatory lesions present, no masses present  Bladder:     Nontender to palpation  Urethra:   Urethral Body:  Urethra palpation normal, urethra structural support normal   Urethral Meatus:  No erythema or lesions present  Cervix:     Appearance healthy, no lesions present, nontender to palpation, no bleeding present  Uterus:     Uterus: firm, normal sized and nontender, midplane in position.   Adnexa:     No adnexal tenderness present, no adnexal masses present  Perineum:     Perineum within normal limits, no evidence of trauma, no rashes or skin lesions present  Anus:     Anus within normal limits, no hemorrhoids present  Inguinal Lymph Nodes:     No lymphadenopathy present  Pubic Hair:     Normal pubic hair distribution for age  Genitalia and Groin:     No rashes present, no lesions present, no areas of discoloration, no masses present        BMI: Body mass index is 23.18 kg/m .      ASSESSMENT:  58 year old female with satisfactory women's health exam.    ICD-10-CM    1. Encounter for gynecological examination without abnormal finding  Z01.419       2. Family history of breast cancer  Z80.3 Adult Oncology/Hematology  Referral          PLAN:  -UTD for cervical cancer screening.   -Breast self awareness discussed. UTD for mammogram.  -Colonoscopy due 2025-27 for hx polyps  -Osteoporosis prevention discussed.  Rec she continue to follow with Rheumatology for bone care.   -Rec future follow up with PCP for general health maintenance   -Family history breast and colon ca. Desires referral to genetic risk management  -PMB precautions  -Return one year for next annual exam        Annette Freemans, DO

## 2024-07-22 ENCOUNTER — PATIENT OUTREACH (OUTPATIENT)
Dept: ONCOLOGY | Facility: CLINIC | Age: 59
End: 2024-07-22
Payer: COMMERCIAL

## 2024-07-22 NOTE — PROGRESS NOTES
Writer received referral to Cancer Risk Management/Genetic Counseling.    Referred for: Genetic Counseling; family history of breast cancer and colon cancer    Referred by:   Provider Department Location Phone   Masters, Annette Gallo, DO [facility] [phone]     Referral reviewed for appropriate plan and sent to New Patient Scheduling ([phone]) for completion.

## 2025-01-21 ENCOUNTER — VIRTUAL VISIT (OUTPATIENT)
Dept: ONCOLOGY | Facility: CLINIC | Age: 60
End: 2025-01-21
Attending: OBSTETRICS & GYNECOLOGY
Payer: COMMERCIAL

## 2025-01-21 DIAGNOSIS — Z80.52 FAMILY HISTORY OF BLADDER CANCER: ICD-10-CM

## 2025-01-21 DIAGNOSIS — Z80.3 FAMILY HISTORY OF BREAST CANCER: Primary | ICD-10-CM

## 2025-01-21 DIAGNOSIS — Z80.42 FAMILY HISTORY OF PROSTATE CANCER: ICD-10-CM

## 2025-01-21 DIAGNOSIS — Z80.0 FAMILY HISTORY OF COLON CANCER: ICD-10-CM

## 2025-01-21 PROCEDURE — 96041 GENETIC COUNSELING SVC EA 30: CPT | Mod: GT,95 | Performed by: GENETIC COUNSELOR, MS

## 2025-01-21 NOTE — LETTER
Cancer Risk Management  Program    Mailing Address  Cancer Risk Management Program  [address]  [place] [address]    New patient appointments  [phone]  January 21, 2025    Susan Reyes  [address] [facility] [address]      Dear Susan,    It was a pleasure talking with you via your virtual genetic counseling visit on 01/21/25.  Below is a copy of the progress note from that recent visit through the Cancer Risk Management Program.  If you have any additional questions, please feel free to contact me.    Cancer Risk Management Program Genetic Counseling Note    1/21/2025    Referring Provider: Dr. Annette Roman    Presenting Information:   I had a video visit with Susan Reyes today for genetic counseling through the Cancer Risk Management Program, in order to discuss her family history of breast, colon, and other cancer.  She presents today to review this history, cancer screening recommendations, and available genetic testing options.    Personal History:  Susan is a 59 year old female. She does not have any personal history of cancer. In other medical history, Susan has been diagnosed with osteoporosis based on imaging of her bone density. She reports no other chronic medical issues or significant health events. With respect to her hormone-based medical history, Susan reports having her first period at age 13 and her first birth at age 31. She reports undergoing menopause around age 55. She reports using oral contraceptives for about 1 year in the past; she reports no history of hormone replacement therapy.    With respect to her cancer screening history, Susan's most recent mammogram was in July and was negative; she reports no history of abnormal mammograms. Susan had a colonoscopy in 2011, which did not note any polyps. Her follow up colonoscopy was in 2022, which removed two polyps (tubular adenomas on pathology review); her next colonoscopy was recommended to be in  3-5 years time. Susan reports having skin checks done regularly; she had an epidermoid cyst removed from her back in , and she also reports having a skin biopsy from her ear done (which she states was benign).  It is noted in Susan's chart that she had an endometrial biopsy in  because endometrial cells were noted on her Pap smear; this biopsy did not note any malignancy or atypia; Susan does not recall this procedure, but she does report no other history of abnormal Pap smears or gynecological issues.    Family History: (Please see scanned pedigree for detailed family history information)  As noted above, Susan has a history of two tubular adenomas removed from her colon at age 56.    Susan reports that one of her maternal half-sisters has also had a few colon polyps removed.    Susan reports that her mother was diagnosed with breast cancer at age 45, which was treated with a surgery. Susan reports that her mother was later diagnosed with a bladder cancer at age 74; Susan states that her mother wondered if that cancer could have been related to chemical exposures when gardening. Susan states that her mother did not have hereditary cancer genetic testing before her death.    Susan reports that her maternal aunt was diagnosed with colon cancer at age 78; Susan is not aware of her maternal aunt having had any genetic testing prior to her death related to complications of dementia.    Susan reports that her maternal grandfather was diagnosed with colon cancer at age 63 and, unfortunately,  of complications of that cancer the next year; no hereditary cancer genetic testing would have been available at that time.    On the other side of the family, Susan reports that she has two paternal first cousins (brothers to each other) that have been diagnosed with aggressive forms of prostate cancer in their 60s; Susan reports that it is her understanding that this cancer history is related to their mother's family  history of cancer (who is not biologically related to Susan, as these cousins are her father's brother's sons).    Susan reports that her paternal grandmother  of complications of metastatic colon cancer around the age of 74.    In other medical family history, Susan reports that she had a brother that  as a  because of a congenital heart defect; she reports that her understanding is that he had a very narrow aorta. Susan states that one of her maternal half-sisters also had a baby that  from complications of a congenital heart defect. (We very briefly briefly reviewed that some left-sided heart defects can be familial, including more subtle left-sided heart defects like bicuspid aortic valve. Susan is not aware of any other individuals in the family being identified to have a congenital heart defect.)  Susan is not aware of any known family history of intellectual disabilities, known genetic conditions, or obvious skin abnormalities.    Information about genetic ancestry was collected, as specific genetic variants may be more common in certain ethnic backgrounds. Susan reports that her mother's family is of Spanish and Sudanese ancestry and that her father's family is of Turkmen ancestry. There is no known Ashkenazi Mu-ism ancestry on either side of her family. There is no reported consanguinity.    Discussion:  We reviewed the features of sporadic, familial, and hereditary cancers. In looking at Susan's family history, it is possible that a cancer susceptibility gene mutation is present due to her mother's history of early onset breast cancer. It has been estimated that about 10% of breast cancers are related to a mutation in a known hereditary cancer gene; we talked about that, in particular, early onset breast cancers are more likely to be related to a hereditary risk factor. In addition, Susan does report at least two generations of individuals with colon cancer in her family, which could be  suggestive of a hereditary colon cancer risk factor in the family; there are some genes that can link bladder cancer and colon cancer in some families, and so the history of those cancers in the family could potentially be related to each other.     We discussed the natural history and genetics of hereditary cancer syndromes in general. A detailed handout regarding the information we discussed was provided to Susan at the end of our appointment today and can be found in the after visit summary. Topics included: inheritance pattern, cancer risks, cancer screening recommendations, and also risks, benefits and limitations of testing.      - An example of one of the hereditary cancer syndromes associated with breast cancer is Hereditary Breast and Ovarian (HBOC) syndrome.  HBOC syndrome is caused by a mutation in the BRCA1 or BRCA2 gene.  HBOC syndrome typically presents with multiple family members diagnosed with breast cancer before age 50 and/or ovarian cancer. Other cancer risks associated with HBOC syndrome include male breast cancer, prostate cancer, pancreatic cancer, and melanoma.      - An example of one of the hereditary cancer syndromes associated with colon (and bladder) cancer is Hernandez syndrome.  Hernandez syndrome can be caused by a mutation in one of five genes:  MLH1, MSH2, MSH6, PMS2, and EPCAM.  The highest cancer risks associated with Hernandez syndrome include colon cancer, endometrial/uterine cancer, gastric cancer, and ovarian cancer.  Other cancers have also been reported with Hernandez syndrome, such as urinary tract, pancreas, bile duct, and other cancers.      Based on her family history, Susan meets current National Comprehensive Cancer Network (NCCN) criteria for genetic testing of high-penetrance breast cancer susceptibility genes (including BRCA1, BRCA2, CDH1, PALB2, PTEN, and TP53), because of her mother's history of breast cancer diagnosed at age 45.  Susan also meets NCCN criteria for the genes  associated with Hernandez syndrome (MLH1, MSH2, MSH6, PMS2, and EPCAM), because of her family history of three close relatives with cancers known to sometimes be associated with Hernandez syndrome (maternal grandfather and maternal aunt with colon cancer; mother with bladder cancer.)  This testing is appropriate to offer Susan, as the individuals affected with cancer in her family did not have hereditary cancer genetic testing done prior to their deaths.    We discussed that there are additional genes besides those listed above that could cause increased risk for breast, colon, and other cancer. As many of these genes present with overlapping features in a family and accurate cancer risk cannot always be established based upon the pedigree analysis alone, it would be reasonable for Susan to consider panel genetic testing to analyze multiple genes at once.    We discussed that genetic testing for hereditary cancer genes is typically most informative when it is first performed in a family member with a personal history of cancer, in order to identify if the cancers in the family can be identified to be associated with a particular genetic cause. However, these individuals are  and did not have hereditary cancer genetic testing done. As noted above, given that fact, it is appropriate for Susan to consider genetic testing for herself, as these results could impact her recommended medical management. We discussed that if Susan pursues genetic testing at this time and receives a negative result, it will not be known if this is because there is a hereditary cancer gene mutation in the family that she did not inherit it, or if instead, there are other genetic and/or environmental risk factors that are impacting the risk for cancer in this family.  As such, there may still be increased cancer screening recommended for Susan even if she tests negative.     Susan stated that she was interested in pursuing genetic testing  through a panel of genes associated with hereditary cancer syndromes, and so we reviewed the various panel options and their potential benefits and limitations.  Based on the above discussion, Susan ultimately decided that she would like to proceed with a genetic analysis for BRCA1 and BRCA2 (with a subsequent automatic reflex to the comprehensive hereditary cancer genetic testing panel) through the [facility] at [facility].   As such, recommendations for Susan's future cancer risk management will be discussed in detail once genetic testing is completed.      Medical Management: For Susan, we reviewed that the information from genetic testing may determine:  additional cancer screening for which Susan may qualify (i.e. mammogram and breast MRI, more frequent colonoscopies, more frequent dermatologic exams, etc.),  options for risk-reducing surgeries Susan could consider, if indicated/desired (eg. bilateral mastectomy, surgery to remove her ovaries and/or uterus, etc.),    and targeted therapies for Susan, if she were to develop certain cancers in the future (eg. immunotherapy for individuals with Hernandez syndrome, PARP inhibitors for HBOC syndrome, etc.).     In particular, Susan had questions today about the most appropriate breast screening and colonoscopy screening protocols for her.  We reviewed that these recommendations will be discussed in detail once genetic testing is completed, as her specific recommendations may vary depending on her genetic testing results.     Plan:  1) Today, Susan decided to proceed with a genetic analysis for BRCA1 and BRCA2 (with a subsequent automatic reflex to the comprehensive hereditary cancer genetic testing panel) through the [facility] at [facility].  Therefore, consent was reviewed and verbally obtained for this testing.    2) A prior authorization process with be investigated, and Susan will be contacted by the   regarding that information.   3) We reviewed the options for sample collection.  Susan plans to have her blood drawn at her local [facility] lab. Test results are expected to be available within 4-6 weeks.  4) Susan will either have a telephone visit or video visit to discuss the results.  Additional recommendations about screening will be made at that time.    Gayla Justice MS, [facility]  Genetic Counselor  Cancer Risk Management Program

## 2025-01-21 NOTE — LETTER
1/21/2025      Susan Reyes  [address] [facility] [address]      Dear Colleague,    Thank you for referring your patient, Susan Reyes, to the [facility]. Please see a copy of my visit note below.    Cancer Risk Management Program Genetic Counseling Note    1/21/2025    Referring Provider: Dr. Annette Roman    Presenting Information:   I had a video visit with Susan Reyes today for genetic counseling through the Cancer Risk Management Program, in order to discuss her family history of breast, colon, and other cancer.  She presents today to review this history, cancer screening recommendations, and available genetic testing options.    Personal History:  Susan is a 59 year old female. She does not have any personal history of cancer. In other medical history, Susan has been diagnosed with osteoporosis based on imaging of her bone density. She reports no other chronic medical issues or significant health events. With respect to her hormone-based medical history, Susan reports having her first period at age 13 and her first birth at age 31. She reports undergoing menopause around age 55. She reports using oral contraceptives for about 1 year in the past; she reports no history of hormone replacement therapy.    With respect to her cancer screening history, Susan's most recent mammogram was in July and was negative; she reports no history of abnormal mammograms. Susan had a colonoscopy in 2011, which did not note any polyps. Her follow up colonoscopy was in 2022, which removed two polyps (tubular adenomas on pathology review); her next colonoscopy was recommended to be in 3-5 years time. Susan reports having skin checks done regularly; she had an epidermoid cyst removed from her back in 2022, and she also reports having a skin biopsy from her ear done (which she states was benign).  It is noted in Susan's chart that she had an endometrial biopsy in 2010 because endometrial cells were noted on  her Pap smear; this biopsy did not note any malignancy or atypia; Susan does not recall this procedure, but she does report no other history of abnormal Pap smears or gynecological issues.    Family History: (Please see scanned pedigree for detailed family history information)  As noted above, Susan has a history of two tubular adenomas removed from her colon at age 56.    Susan reports that one of her maternal half-sisters has also had a few colon polyps removed.    Susan reports that her mother was diagnosed with breast cancer at age 45, which was treated with a surgery. Susan reports that her mother was later diagnosed with a bladder cancer at age 74; Susan states that her mother wondered if that cancer could have been related to chemical exposures when gardening. Susan states that her mother did not have hereditary cancer genetic testing before her death.    Susan reports that her maternal aunt was diagnosed with colon cancer at age 78; Susan is not aware of her maternal aunt having had any genetic testing prior to her death related to complications of dementia.    Susan reports that her maternal grandfather was diagnosed with colon cancer at age 63 and, unfortunately,  of complications of that cancer the next year; no hereditary cancer genetic testing would have been available at that time.    On the other side of the family, Susan reports that she has two paternal first cousins (brothers to each other) that have been diagnosed with aggressive forms of prostate cancer in their 60s; Susan reports that it is her understanding that this cancer history is related to their mother's family history of cancer (who is not biologically related to Susan, as these cousins are her father's brother's sons).    Susan reports that her paternal grandmother  of complications of metastatic colon cancer around the age of 74.    In other medical family history, Susan reports that she had a brother that  as a   because of a congenital heart defect; she reports that her understanding is that he had a very narrow aorta. Susan states that one of her maternal half-sisters also had a baby that  from complications of a congenital heart defect. (We very briefly briefly reviewed that some left-sided heart defects can be familial, including more subtle left-sided heart defects like bicuspid aortic valve. Susan is not aware of any other individuals in the family being identified to have a congenital heart defect.)  Susan is not aware of any known family history of intellectual disabilities, known genetic conditions, or obvious skin abnormalities.    Information about genetic ancestry was collected, as specific genetic variants may be more common in certain ethnic backgrounds. Susan reports that her mother's family is of [place] and [place]ian ancestry and that her father's family is of Montenegrin ancestry. There is no known Ashkenazi Spiritism ancestry on either side of her family. There is no reported consanguinity.    Discussion:  We reviewed the features of sporadic, familial, and hereditary cancers. In looking at Susan's family history, it is possible that a cancer susceptibility gene mutation is present due to her mother's history of early onset breast cancer. It has been estimated that about 10% of breast cancers are related to a mutation in a known hereditary cancer gene; we talked about that, in particular, early onset breast cancers are more likely to be related to a hereditary risk factor. In addition, Susan does report at least two generations of individuals with colon cancer in her family, which could be suggestive of a hereditary colon cancer risk factor in the family; there are some genes that can link bladder cancer and colon cancer in some families, and so the history of those cancers in the family could potentially be related to each other.     We discussed the natural history and genetics of hereditary cancer syndromes  in general. A detailed handout regarding the information we discussed was provided to Susan at the end of our appointment today and can be found in the after visit summary. Topics included: inheritance pattern, cancer risks, cancer screening recommendations, and also risks, benefits and limitations of testing.      - An example of one of the hereditary cancer syndromes associated with breast cancer is Hereditary Breast and Ovarian (HBOC) syndrome.  HBOC syndrome is caused by a mutation in the BRCA1 or BRCA2 gene.  HBOC syndrome typically presents with multiple family members diagnosed with breast cancer before age 50 and/or ovarian cancer. Other cancer risks associated with HBOC syndrome include male breast cancer, prostate cancer, pancreatic cancer, and melanoma.      - An example of one of the hereditary cancer syndromes associated with colon (and bladder) cancer is Hernandez syndrome.  Hernandez syndrome can be caused by a mutation in one of five genes:  MLH1, MSH2, MSH6, PMS2, and EPCAM.  The highest cancer risks associated with Hernandez syndrome include colon cancer, endometrial/uterine cancer, gastric cancer, and ovarian cancer.  Other cancers have also been reported with Hernandez syndrome, such as urinary tract, pancreas, bile duct, and other cancers.      Based on her family history, Susan meets current National Comprehensive Cancer Network (NCCN) criteria for genetic testing of high-penetrance breast cancer susceptibility genes (including BRCA1, BRCA2, CDH1, PALB2, PTEN, and TP53), because of her mother's history of breast cancer diagnosed at age 45.  Susan also meets NCCN criteria for the genes associated with Hernandez syndrome (MLH1, MSH2, MSH6, PMS2, and EPCAM), because of her family history of three close relatives with cancers known to sometimes be associated with Hernandez syndrome (maternal grandfather and maternal aunt with colon cancer; mother with bladder cancer.)  This testing is appropriate to offer Susan, as the  individuals affected with cancer in her family did not have hereditary cancer genetic testing done prior to their deaths.    We discussed that there are additional genes besides those listed above that could cause increased risk for breast, colon, and other cancer. As many of these genes present with overlapping features in a family and accurate cancer risk cannot always be established based upon the pedigree analysis alone, it would be reasonable for Susan to consider panel genetic testing to analyze multiple genes at once.    We discussed that genetic testing for hereditary cancer genes is typically most informative when it is first performed in a family member with a personal history of cancer, in order to identify if the cancers in the family can be identified to be associated with a particular genetic cause. However, these individuals are  and did not have hereditary cancer genetic testing done. As noted above, given that fact, it is appropriate for Susan to consider genetic testing for herself, as these results could impact her recommended medical management. We discussed that if Susan pursues genetic testing at this time and receives a negative result, it will not be known if this is because there is a hereditary cancer gene mutation in the family that she did not inherit it, or if instead, there are other genetic and/or environmental risk factors that are impacting the risk for cancer in this family.  As such, there may still be increased cancer screening recommended for Susan even if she tests negative.     Susan stated that she was interested in pursuing genetic testing through a panel of genes associated with hereditary cancer syndromes, and so we reviewed the various panel options and their potential benefits and limitations.  Based on the above discussion, Susan ultimately decided that she would like to proceed with a genetic analysis for BRCA1 and BRCA2 (with a subsequent automatic reflex to  the comprehensive hereditary cancer genetic testing panel) through the [facility] at [facility].   As such, recommendations for Susan's future cancer risk management will be discussed in detail once genetic testing is completed.      Medical Management: For Susan, we reviewed that the information from genetic testing may determine:  additional cancer screening for which Susan may qualify (i.e. mammogram and breast MRI, more frequent colonoscopies, more frequent dermatologic exams, etc.),  options for risk-reducing surgeries Suasn could consider, if indicated/desired (eg. bilateral mastectomy, surgery to remove her ovaries and/or uterus, etc.),    and targeted therapies for Susan, if she were to develop certain cancers in the future (eg. immunotherapy for individuals with Hernandez syndrome, PARP inhibitors for HBOC syndrome, etc.).     In particular, Susan had questions today about the most appropriate breast screening and colonoscopy screening protocols for her.  We reviewed that these recommendations will be discussed in detail once genetic testing is completed, as her specific recommendations may vary depending on her genetic testing results.     Plan:  1) Today, Susan decided to proceed with a genetic analysis for BRCA1 and BRCA2 (with a subsequent automatic reflex to the comprehensive hereditary cancer genetic testing panel) through the [facility] at [facility].  Therefore, consent was reviewed and verbally obtained for this testing.    2) A prior authorization process with be investigated, and Susan will be contacted by the  regarding that information.   3) We reviewed the options for sample collection.  Susan plans to have her blood drawn at her local [facility] lab. Test results are expected to be available within 4-6 weeks.  4) Susan will either have a telephone visit or video visit to discuss the results.  Additional recommendations about  screening will be made at that time.    Gayla Justice MS, [facility]  Genetic Counselor  Cancer Risk Management Program      I spent 130 minutes on the date of the encounter doing chart review, history and exam, documentation and further activities as noted above.    Virtual Visit Details    Type of service:  Video Visit     Originating Location (pt. Location): Home  Distant Location (provider location):  Off-site  Platform used for Video Visit: Well      Again, thank you for allowing me to participate in the care of your patient.        Sincerely,        Parris Justice GC    Electronically signed

## 2025-01-21 NOTE — NURSING NOTE
Current patient location: [address] [place] [id number]      Is the patient currently in the state of [place]? YES    Visit mode:VIDEO    If the visit is dropped, the patient can be reconnected by: VIDEO VISIT: Send to e-mail at: [email or web address]    Will anyone else be joining the visit? NO  (If patient encounters technical issues they should call [phone]    How would you like to obtain your AVS? MyChart    Are changes needed to the allergy or medication list? N/A    Reason for visit: Consult      Bharti SNEED

## 2025-01-21 NOTE — PATIENT INSTRUCTIONS
Assessing Cancer Risk  Only about 5-10% of cancers are thought to be due to an inherited cancer susceptibility gene.    These families often have:  Several people with the same or related types of cancer  Cancers diagnosed at a young age (before age 50)  Individuals with more than one primary cancer  Multiple generations of the family affected with cancer    Genetic Testing  Genetic testing involves a blood or saliva test and will look at the genetic information in select genes for any harmful mutations that are associated with increased cancer risk.  If possible, it is recommended that the person(s) who has had cancer be tested before other family members.  That person will give us the most useful information about whether or not a specific gene is associated with the cancer in the family.     Results  There are three possible results of genetic testing:  Positive--a harmful mutation was identified  Negative--no mutation was identified  Variant of unknown significance--a variation in one of the genes was identified, but it is unclear how this impacts cancer risk in the family    Advantages and Disadvantages  There are advantages and disadvantages to genetic testing.    Advantages  May clarify your cancer risk  Can help you make medical decisions  May explain the cancers in your family  May give useful information to your family members (if you share your results)    Disadvantages  Possible negative emotional impact of learning about inherited cancer risk  Uncertainty in interpreting a negative test result in some situations  Possible genetic discrimination concerns (see below)    Inheritance   Mutations in most cancer risk genes are inherited in an autosomal dominant pattern.  This means that if a parent has a mutation, each of their children will have a 50% chance of inheriting that same mutation.  Therefore, each child would have a 50% chance of being at increased risk for developing cancer.                                               Image obtained from Genetics Home Reference, 2013     Genetic Information Nondiscrimination Act (BENY)  BENY is a federal law that protects individuals from health insurance or employment discrimination based on a genetic test result alone.  Although rare, there are currently no legal protections in terms of life insurance, long term care, or disability insurances.  Visit the National [employer] [place] at Genome.gov/14929863 to learn more.    Reducing Cancer Risk  If a harmful mutation is found in a cancer risk gene, there may be certain screening tests or preventative surgeries that can be offered. This information will be discussed after genetic testing is completed. If no mutations are found on genetic testing, screening is then recommended based on personal and/or family history of cancer.     Questions to Think About Regarding Genetic Testing  What effect will the test result have on me and my relationship with my family members if I have an inherited gene mutation?  If I don t have a gene mutation?  Should I share my test results, and how will my family react to this news, which may also affect them?  Are my children ready to learn new information that may one day affect their own health?    Resources  American Cancer Society (ACS) cancer.org   National Cancer [place] (NCI) cancer.gov     Please call us if you have any questions or concerns.   Cancer Risk Management Program [phone] ([phone])  Maxx Rachel, MS [facility]   Mahsa Carter, MS, ZACK Justice MS, ZACK [email or web address]  Malika Hopper, MS, [facility]    Aidee Chavez, MS, [facility]    Vero Durant, MS, [facility]   Annette Luo, MS, [facility]

## 2025-01-21 NOTE — PROGRESS NOTES
Cancer Risk Management Program Genetic Counseling Note    1/21/2025    Referring Provider: Dr. Annette Roman    Presenting Information:   I had a video visit with Susan Reyes today for genetic counseling through the Cancer Risk Management Program, in order to discuss her family history of breast, colon, and other cancer.  She presents today to review this history, cancer screening recommendations, and available genetic testing options.    Personal History:  Susan is a 59 year old female. She does not have any personal history of cancer. In other medical history, Susan has been diagnosed with osteoporosis based on imaging of her bone density. She reports no other chronic medical issues or significant health events. With respect to her hormone-based medical history, Susan reports having her first period at age 13 and her first birth at age 31. She reports undergoing menopause around age 55. She reports using oral contraceptives for about 1 year in the past; she reports no history of hormone replacement therapy.    With respect to her cancer screening history, Susan's most recent mammogram was in July and was negative; she reports no history of abnormal mammograms. Susan had a colonoscopy in 2011, which did not note any polyps. Her follow up colonoscopy was in 2022, which removed two polyps (tubular adenomas on pathology review); her next colonoscopy was recommended to be in 3-5 years time. Susan reports having skin checks done regularly; she had an epidermoid cyst removed from her back in 2022, and she also reports having a skin biopsy from her ear done (which she states was benign).  It is noted in Susan's chart that she had an endometrial biopsy in 2010 because endometrial cells were noted on her Pap smear; this biopsy did not note any malignancy or atypia; Susan does not recall this procedure, but she does report no other history of abnormal Pap smears or gynecological issues.    Family History: (Please see scanned  pedigree for detailed family history information)  As noted above, Susan has a history of two tubular adenomas removed from her colon at age 56.    Susan reports that one of her maternal half-sisters has also had a few colon polyps removed.    Susan reports that her mother was diagnosed with breast cancer at age 45, which was treated with a surgery. Susan reports that her mother was later diagnosed with a bladder cancer at age 74; Susan states that her mother wondered if that cancer could have been related to chemical exposures when gardening. Susan states that her mother did not have hereditary cancer genetic testing before her death.    Susan reports that her maternal aunt was diagnosed with colon cancer at age 78; Susan is not aware of her maternal aunt having had any genetic testing prior to her death related to complications of dementia.    Susan reports that her maternal grandfather was diagnosed with colon cancer at age 63 and, unfortunately,  of complications of that cancer the next year; no hereditary cancer genetic testing would have been available at that time.    On the other side of the family, Susan reports that she has two paternal first cousins (brothers to each other) that have been diagnosed with aggressive forms of prostate cancer in their 60s; Susan reports that it is her understanding that this cancer history is related to their mother's family history of cancer (who is not biologically related to Susan, as these cousins are her father's brother's sons).    Susan reports that her paternal grandmother  of complications of metastatic colon cancer around the age of 74.    In other medical family history, Susan reports that she had a brother that  as a  because of a congenital heart defect; she reports that her understanding is that he had a very narrow aorta. Susan states that one of her maternal half-sisters also had a baby that  from complications of a congenital heart  defect. (We very briefly briefly reviewed that some left-sided heart defects can be familial, including more subtle left-sided heart defects like bicuspid aortic valve. Susan is not aware of any other individuals in the family being identified to have a congenital heart defect.)  Susan is not aware of any known family history of intellectual disabilities, known genetic conditions, or obvious skin abnormalities.    Information about genetic ancestry was collected, as specific genetic variants may be more common in certain ethnic backgrounds. Susan reports that her mother's family is of [place] and Greek ancestry and that her father's family is of [place] ancestry. There is no known Ashkenazi Lutheran ancestry on either side of her family. There is no reported consanguinity.    Discussion:  We reviewed the features of sporadic, familial, and hereditary cancers. In looking at Susan's family history, it is possible that a cancer susceptibility gene mutation is present due to her mother's history of early onset breast cancer. It has been estimated that about 10% of breast cancers are related to a mutation in a known hereditary cancer gene; we talked about that, in particular, early onset breast cancers are more likely to be related to a hereditary risk factor. In addition, Susan does report at least two generations of individuals with colon cancer in her family, which could be suggestive of a hereditary colon cancer risk factor in the family; there are some genes that can link bladder cancer and colon cancer in some families, and so the history of those cancers in the family could potentially be related to each other.     We discussed the natural history and genetics of hereditary cancer syndromes in general. A detailed handout regarding the information we discussed was provided to Susan at the end of our appointment today and can be found in the after visit summary. Topics included: inheritance pattern, cancer risks,  cancer screening recommendations, and also risks, benefits and limitations of testing.      - An example of one of the hereditary cancer syndromes associated with breast cancer is Hereditary Breast and Ovarian (HBOC) syndrome.  HBOC syndrome is caused by a mutation in the BRCA1 or BRCA2 gene.  HBOC syndrome typically presents with multiple family members diagnosed with breast cancer before age 50 and/or ovarian cancer. Other cancer risks associated with HBOC syndrome include male breast cancer, prostate cancer, pancreatic cancer, and melanoma.      - An example of one of the hereditary cancer syndromes associated with colon (and bladder) cancer is Hernandez syndrome.  Hernandez syndrome can be caused by a mutation in one of five genes:  MLH1, MSH2, MSH6, PMS2, and EPCAM.  The highest cancer risks associated with Hernandez syndrome include colon cancer, endometrial/uterine cancer, gastric cancer, and ovarian cancer.  Other cancers have also been reported with Hernandez syndrome, such as urinary tract, pancreas, bile duct, and other cancers.      Based on her family history, Susan meets current National Comprehensive Cancer Network (NCCN) criteria for genetic testing of high-penetrance breast cancer susceptibility genes (including BRCA1, BRCA2, CDH1, PALB2, PTEN, and TP53), because of her mother's history of breast cancer diagnosed at age 45.  Susan also meets NCCN criteria for the genes associated with Hernandez syndrome (MLH1, MSH2, MSH6, PMS2, and EPCAM), because of her family history of three close relatives with cancers known to sometimes be associated with Hernandez syndrome (maternal grandfather and maternal aunt with colon cancer; mother with bladder cancer.)  This testing is appropriate to offer Susan, as the individuals affected with cancer in her family did not have hereditary cancer genetic testing done prior to their deaths.    We discussed that there are additional genes besides those listed above that could cause increased  risk for breast, colon, and other cancer. As many of these genes present with overlapping features in a family and accurate cancer risk cannot always be established based upon the pedigree analysis alone, it would be reasonable for Susan to consider panel genetic testing to analyze multiple genes at once.    We discussed that genetic testing for hereditary cancer genes is typically most informative when it is first performed in a family member with a personal history of cancer, in order to identify if the cancers in the family can be identified to be associated with a particular genetic cause. However, these individuals are  and did not have hereditary cancer genetic testing done. As noted above, given that fact, it is appropriate for Susan to consider genetic testing for herself, as these results could impact her recommended medical management. We discussed that if Susan pursues genetic testing at this time and receives a negative result, it will not be known if this is because there is a hereditary cancer gene mutation in the family that she did not inherit it, or if instead, there are other genetic and/or environmental risk factors that are impacting the risk for cancer in this family.  As such, there may still be increased cancer screening recommended for Susan even if she tests negative.     Susan stated that she was interested in pursuing genetic testing through a panel of genes associated with hereditary cancer syndromes, and so we reviewed the various panel options and their potential benefits and limitations.  Based on the above discussion, Susan ultimately decided that she would like to proceed with a genetic analysis for BRCA1 and BRCA2 (with a subsequent automatic reflex to the comprehensive hereditary cancer genetic testing panel) through the [facility] at [facility].   As such, recommendations for Susan's future cancer risk management will be discussed in detail once  genetic testing is completed.      Medical Management: For Susan, we reviewed that the information from genetic testing may determine:  additional cancer screening for which Susan may qualify (i.e. mammogram and breast MRI, more frequent colonoscopies, more frequent dermatologic exams, etc.),  options for risk-reducing surgeries Susan could consider, if indicated/desired (eg. bilateral mastectomy, surgery to remove her ovaries and/or uterus, etc.),    and targeted therapies for Susan, if she were to develop certain cancers in the future (eg. immunotherapy for individuals with Hernandez syndrome, PARP inhibitors for HBOC syndrome, etc.).     In particular, Susan had questions today about the most appropriate breast screening and colonoscopy screening protocols for her.  We reviewed that these recommendations will be discussed in detail once genetic testing is completed, as her specific recommendations may vary depending on her genetic testing results.     Plan:  1) Today, Susan decided to proceed with a genetic analysis for BRCA1 and BRCA2 (with a subsequent automatic reflex to the comprehensive hereditary cancer genetic testing panel) through the [facility] at [facility].  Therefore, consent was reviewed and verbally obtained for this testing.    2) A prior authorization process with be investigated, and Susan will be contacted by the  regarding that information.   3) We reviewed the options for sample collection.  Susan plans to have her blood drawn at her local [facility] lab. Test results are expected to be available within 4-6 weeks.  4) Susan will either have a telephone visit or video visit to discuss the results.  Additional recommendations about screening will be made at that time.    Gayla Justice MS, Merged with [facility]  Genetic Counselor  Cancer Risk Management Program      I spent 130 minutes on the date of the encounter doing chart review, history and exam, documentation and  further activities as noted above.    Virtual Visit Details    Type of service:  Video Visit     Originating Location (pt. Location): Home  Distant Location (provider location):  Off-site  Platform used for Video Visit: [employer]

## 2025-03-04 ENCOUNTER — TELEPHONE (OUTPATIENT)
Dept: CONSULT | Facility: CLINIC | Age: 60
End: 2025-03-04
Payer: COMMERCIAL

## 2025-03-04 NOTE — TELEPHONE ENCOUNTER
Susan emailed me asking for follow up from genetics about testing.      Sydney Garrett MA  - Genetics  Phone: [phone]  Fax: [phone]  [email or web address]

## 2025-08-08 ENCOUNTER — ANCILLARY PROCEDURE (OUTPATIENT)
Dept: MAMMOGRAPHY | Facility: CLINIC | Age: 60
End: 2025-08-08
Payer: COMMERCIAL

## 2025-08-08 DIAGNOSIS — Z12.31 VISIT FOR SCREENING MAMMOGRAM: ICD-10-CM

## 2025-08-08 PROCEDURE — 77063 BREAST TOMOSYNTHESIS BI: CPT | Mod: TC | Performed by: RADIOLOGY

## 2025-08-08 PROCEDURE — 77067 SCR MAMMO BI INCL CAD: CPT | Mod: TC | Performed by: RADIOLOGY

## 2025-08-12 DIAGNOSIS — Z80.52 FAMILY HISTORY OF BLADDER CANCER: ICD-10-CM

## 2025-08-12 DIAGNOSIS — Z80.42 FAMILY HISTORY OF PROSTATE CANCER: ICD-10-CM

## 2025-08-12 DIAGNOSIS — Z80.0 FAMILY HISTORY OF COLON CANCER: ICD-10-CM

## 2025-08-12 DIAGNOSIS — Z80.3 FAMILY HISTORY OF BREAST CANCER: Primary | ICD-10-CM

## (undated) RX ORDER — FENTANYL CITRATE 50 UG/ML
INJECTION, SOLUTION INTRAMUSCULAR; INTRAVENOUS
Status: DISPENSED
Start: 2022-02-04